# Patient Record
Sex: FEMALE | Race: BLACK OR AFRICAN AMERICAN | NOT HISPANIC OR LATINO | Employment: OTHER | URBAN - METROPOLITAN AREA
[De-identification: names, ages, dates, MRNs, and addresses within clinical notes are randomized per-mention and may not be internally consistent; named-entity substitution may affect disease eponyms.]

---

## 2017-04-17 ENCOUNTER — OFFICE VISIT (OUTPATIENT)
Dept: OBSTETRICS AND GYNECOLOGY | Facility: CLINIC | Age: 22
End: 2017-04-17
Payer: COMMERCIAL

## 2017-04-17 VITALS
SYSTOLIC BLOOD PRESSURE: 124 MMHG | HEIGHT: 65 IN | RESPIRATION RATE: 16 BRPM | WEIGHT: 240.94 LBS | DIASTOLIC BLOOD PRESSURE: 78 MMHG | BODY MASS INDEX: 40.14 KG/M2

## 2017-04-17 DIAGNOSIS — Z01.419 VISIT FOR GYNECOLOGIC EXAMINATION: Primary | ICD-10-CM

## 2017-04-17 DIAGNOSIS — B96.89 BV (BACTERIAL VAGINOSIS): ICD-10-CM

## 2017-04-17 DIAGNOSIS — N76.0 BV (BACTERIAL VAGINOSIS): ICD-10-CM

## 2017-04-17 DIAGNOSIS — Z12.4 ENCOUNTER FOR PAPANICOLAOU SMEAR FOR CERVICAL CANCER SCREENING: ICD-10-CM

## 2017-04-17 PROCEDURE — 88175 CYTOPATH C/V AUTO FLUID REDO: CPT

## 2017-04-17 PROCEDURE — 99395 PREV VISIT EST AGE 18-39: CPT | Mod: S$GLB,,, | Performed by: ADVANCED PRACTICE MIDWIFE

## 2017-04-17 PROCEDURE — 87591 N.GONORRHOEAE DNA AMP PROB: CPT

## 2017-04-17 PROCEDURE — 99999 PR PBB SHADOW E&M-EST. PATIENT-LVL III: CPT | Mod: PBBFAC,,, | Performed by: ADVANCED PRACTICE MIDWIFE

## 2017-04-17 RX ORDER — METRONIDAZOLE 500 MG/1
500 TABLET ORAL EVERY 12 HOURS
Qty: 14 TABLET | Refills: 0 | Status: SHIPPED | OUTPATIENT
Start: 2017-04-17 | End: 2017-04-24

## 2017-04-17 RX ORDER — MELOXICAM 15 MG/1
1 TABLET ORAL DAILY
Refills: 1 | COMMUNITY
Start: 2017-04-10 | End: 2020-01-27

## 2017-04-17 RX ORDER — NORETHINDRONE ACETATE AND ETHINYL ESTRADIOL .02; 1 MG/1; MG/1
1 TABLET ORAL DAILY
Qty: 30 TABLET | Refills: 11 | Status: SHIPPED | OUTPATIENT
Start: 2017-04-17 | End: 2018-11-12

## 2017-04-17 NOTE — PROGRESS NOTES
Subjective:       Patient ID: Roger Juarez is a 22 y.o. female.    Chief Complaint:  No chief complaint on file.    Patient's last menstrual period was 2017.  History of Present Illness  Annual exam without complaint    OB History    Para Term  AB SAB TAB Ectopic Multiple Living   1 1 1      0 1      # Outcome Date GA Lbr Miguel/2nd Weight Sex Delivery Anes PTL Lv   1 Term 16 38w6d  3.124 kg (6 lb 14.2 oz) M CS-LTranv EPI N Y      Complications: Fetal Intolerance          Review of Systems  Review of Systems   All other systems reviewed and are negative.          Objective:    Physical Exam   Constitutional: She is oriented to person, place, and time. She appears well-developed and well-nourished.   HENT:   Head: Normocephalic.   Neck: Normal range of motion.   Abdominal: Soft. Bowel sounds are normal.   Genitourinary: Uterus normal. Vaginal discharge (wetprep +clue cells) found.   Genitourinary Comments: Pap collected   Musculoskeletal: Normal range of motion.   Neurological: She is alert and oriented to person, place, and time.   Skin: Skin is warm and dry.   Psychiatric: She has a normal mood and affect.         Assessment:     1. Visit for gynecologic examination    2. Encounter for Papanicolaou smear for cervical cancer screening    3. BV (bacterial vaginosis)              Plan:   Diagnoses and all orders for this visit:    Visit for gynecologic examination  -     C. trachomatis/N. gonorrhoeae by AMP DNA Vagina    Encounter for Papanicolaou smear for cervical cancer screening  -     Liquid-based pap smear, screening    BV (bacterial vaginosis)  -     Liquid-based pap smear, screening    Other orders  -     metronidazole (FLAGYL) 500 MG tablet; Take 1 tablet (500 mg total) by mouth every 12 (twelve) hours.  -     norethindrone-ethinyl estradiol (MICROGESTIN ) 1-20 mg-mcg per tablet; Take 1 tablet by mouth once daily.    Discussed birth control options due to present pills  causing nausea. Pt wants to continue with birth control pills as her option. Pt to try new pills and will take them at night to see if a decrease in nausea. Stella

## 2017-04-18 LAB
C TRACH DNA SPEC QL NAA+PROBE: NOT DETECTED
N GONORRHOEA DNA SPEC QL NAA+PROBE: NOT DETECTED

## 2018-11-12 ENCOUNTER — OFFICE VISIT (OUTPATIENT)
Dept: OBSTETRICS AND GYNECOLOGY | Facility: CLINIC | Age: 23
End: 2018-11-12
Payer: COMMERCIAL

## 2018-11-12 VITALS
SYSTOLIC BLOOD PRESSURE: 128 MMHG | WEIGHT: 254.63 LBS | DIASTOLIC BLOOD PRESSURE: 74 MMHG | HEIGHT: 65 IN | BODY MASS INDEX: 42.42 KG/M2

## 2018-11-12 DIAGNOSIS — N76.0 VULVOVAGINITIS: ICD-10-CM

## 2018-11-12 DIAGNOSIS — Z01.419 ENCOUNTER FOR GYNECOLOGICAL EXAMINATION WITHOUT ABNORMAL FINDING: Primary | ICD-10-CM

## 2018-11-12 PROCEDURE — 87106 FUNGI IDENTIFICATION YEAST: CPT

## 2018-11-12 PROCEDURE — 87491 CHLMYD TRACH DNA AMP PROBE: CPT

## 2018-11-12 PROCEDURE — 99395 PREV VISIT EST AGE 18-39: CPT | Mod: 25,S$GLB,, | Performed by: OBSTETRICS & GYNECOLOGY

## 2018-11-12 PROCEDURE — 99999 PR PBB SHADOW E&M-EST. PATIENT-LVL III: CPT | Mod: PBBFAC,,, | Performed by: OBSTETRICS & GYNECOLOGY

## 2018-11-12 PROCEDURE — 87070 CULTURE OTHR SPECIMN AEROBIC: CPT

## 2018-11-12 NOTE — PROGRESS NOTES
"CC: Well woman exam    Roger Juarez is a 23 y.o. female  presents for a well woman exam.  LMP: Patient's last menstrual period was 2018 (exact date). thinks she has been getting yeast infection after almost every cycle, using monistat w/ no relief. Concerned about stds as well. Symptoms of vulvar itching but little bit at introital opening. Symptoms started w/ brazilian wax 2018; developed "bumps" few weeks later. Boyfriend of 4 yrs also had "bumps on butt" & received antibiotics from PCP for treatment, which resolved symptoms    Past Medical History:   Diagnosis Date    Hand pain, right      Past Surgical History:   Procedure Laterality Date     SECTION      DELIVERY-CEASAREAN SECTION N/A 2016    Performed by Juan José Felix MD at [facility] L&D     Social History     Socioeconomic History    Marital status: Single     Spouse name: None    Number of children: None    Years of education: None    Highest education level: None   Social Needs    Financial resource strain: None    Food insecurity - worry: None    Food insecurity - inability: None    Transportation needs - medical: None    Transportation needs - non-medical: None   Occupational History    None   Tobacco Use    Smoking status: Never Smoker    Smokeless tobacco: Never Used   Substance and Sexual Activity    Alcohol use: Yes     Alcohol/week: 0.0 oz     Comment: occasional    Drug use: No    Sexual activity: Yes     Partners: Male     Birth control/protection: None   Other Topics Concern    None   Social History Narrative    None     Family History   Problem Relation Age of Onset    Breast cancer Neg Hx     Colon cancer Neg Hx     Ovarian cancer Neg Hx      OB History      Para Term  AB Living    1 1 1     1    SAB TAB Ectopic Multiple Live Births          0 1          Current Outpatient Medications:     meloxicam (MOBIC) 15 MG tablet, Take 1 tablet by mouth once daily., Disp: , Rfl: " "1    GYNECOLOGY HISTORY:  No abnormal pap; chlamydia    DATA REVIEWED:  Last pap: 2017 Results: normal    /74   Ht 5' 5" (1.651 m)   Wt 115.5 kg (254 lb 10.1 oz)   LMP 11/12/2018 (Exact Date)   BMI 42.37 kg/m²     ROS:  GENERAL: Denies weight gain or weight loss. Feeling well overall.   SKIN: Denies rash or lesions.   HEAD: Denies head injury or headache.   NODES: Denies enlarged lymph nodes.   CHEST: Denies chest pain or shortness of breath.   CARDIOVASCULAR: Denies palpitations or left sided chest pain.   ABDOMEN: No abdominal pain, constipation, diarrhea, nausea, vomiting or rectal bleeding.   URINARY: No frequency, dysuria, hematuria, or burning on urination.  REPRODUCTIVE: See HPI.   BREASTS: The patient denies pain, lumps, or nipple discharge.   HEMATOLOGIC: No easy bruisability or excessive bleeding.   MUSCULOSKELETAL: Denies joint pain or swelling.   NEUROLOGIC: Denies syncope or weakness.   PSYCHIATRIC: Denies depression, anxiety or mood swings.    PHYSICAL EXAM:    APPEARANCE: Well nourished, well developed, in no acute distress.  AFFECT: WNL, alert and oriented x 3  SKIN: No acne or hirsutism  NECK: Neck symmetric without masses or thyromegaly  NODES: No inguinal, cervical, axillary, or femoral lymph node enlargement  CHEST: Good respiratory effect  ABDOMEN: Soft.  No tenderness or masses.  No hepatosplenomegaly.  No hernias.  BREASTS: Symmetrical, no skin changes or visible lesions.  No palpable masses, nipple discharge bilaterally.  PELVIC: Normal external genitalia; pinpoint circular abrasion on left labia majora.  Skin bilateral creases dry. Normal hair distribution.  Adequate perineal body, normal urethral meatus.  Vagina moist and well rugated without lesions; cycle bleeding discharge present.  Cervix pink, without lesions, discharge or tenderness.  No significant cystocele or rectocele.  Bimanual exam shows uterus to be normal size, regular, mobile and nontender.  Adnexa without masses or " tenderness.    EXTREMITIES: No edema.    Encounter for gynecological examination without abnormal finding    Vulvovaginitis  -     Genital Culture  -     C. trachomatis/N. gonorrhoeae by AMP DNA    Patient was counseled today on A.C.S. Pap guidelines (Q3) and recommendations for yearly pelvic exams, yearly mammograms starting age 40, and clinical breast exams; to see her PCP for other health maintenance. rec hydrocortisone & clotrimazole to external genitalia prn symptoms

## 2018-11-13 LAB
C TRACH DNA SPEC QL NAA+PROBE: NOT DETECTED
N GONORRHOEA DNA SPEC QL NAA+PROBE: NOT DETECTED

## 2018-11-15 LAB — BACTERIA GENITAL AEROBE CULT: NORMAL

## 2018-11-16 ENCOUNTER — PATIENT MESSAGE (OUTPATIENT)
Dept: OBSTETRICS AND GYNECOLOGY | Facility: CLINIC | Age: 23
End: 2018-11-16

## 2018-11-16 RX ORDER — FLUCONAZOLE 150 MG/1
150 TABLET ORAL ONCE
Qty: 1 TABLET | Refills: 0 | Status: SHIPPED | OUTPATIENT
Start: 2018-11-16 | End: 2018-11-16

## 2018-12-07 ENCOUNTER — PATIENT MESSAGE (OUTPATIENT)
Dept: OBSTETRICS AND GYNECOLOGY | Facility: CLINIC | Age: 23
End: 2018-12-07

## 2019-12-12 ENCOUNTER — LAB VISIT (OUTPATIENT)
Dept: LAB | Facility: HOSPITAL | Age: 24
End: 2019-12-12
Attending: ADVANCED PRACTICE MIDWIFE
Payer: MEDICAID

## 2019-12-12 ENCOUNTER — OFFICE VISIT (OUTPATIENT)
Dept: OBSTETRICS AND GYNECOLOGY | Facility: CLINIC | Age: 24
End: 2019-12-12
Payer: MEDICAID

## 2019-12-12 VITALS — WEIGHT: 25.81 LBS | BODY MASS INDEX: 4.29 KG/M2 | DIASTOLIC BLOOD PRESSURE: 64 MMHG | SYSTOLIC BLOOD PRESSURE: 118 MMHG

## 2019-12-12 DIAGNOSIS — Z32.01 POSITIVE PREGNANCY TEST: Primary | ICD-10-CM

## 2019-12-12 DIAGNOSIS — Z98.891 PREVIOUS CESAREAN SECTION: ICD-10-CM

## 2019-12-12 DIAGNOSIS — Z32.01 POSITIVE PREGNANCY TEST: ICD-10-CM

## 2019-12-12 LAB
ABO + RH BLD: NORMAL
BASOPHILS # BLD AUTO: 0.02 K/UL (ref 0–0.2)
BASOPHILS NFR BLD: 0.4 % (ref 0–1.9)
BLD GP AB SCN CELLS X3 SERPL QL: NORMAL
DIFFERENTIAL METHOD: ABNORMAL
EOSINOPHIL # BLD AUTO: 0 K/UL (ref 0–0.5)
EOSINOPHIL NFR BLD: 0.6 % (ref 0–8)
ERYTHROCYTE [DISTWIDTH] IN BLOOD BY AUTOMATED COUNT: 13.3 % (ref 11.5–14.5)
HCT VFR BLD AUTO: 37.1 % (ref 37–48.5)
HGB BLD-MCNC: 11.6 G/DL (ref 12–16)
IMM GRANULOCYTES # BLD AUTO: 0.02 K/UL (ref 0–0.04)
IMM GRANULOCYTES NFR BLD AUTO: 0.4 % (ref 0–0.5)
LYMPHOCYTES # BLD AUTO: 1.1 K/UL (ref 1–4.8)
LYMPHOCYTES NFR BLD: 22.9 % (ref 18–48)
MCH RBC QN AUTO: 26.4 PG (ref 27–31)
MCHC RBC AUTO-ENTMCNC: 31.3 G/DL (ref 32–36)
MCV RBC AUTO: 84 FL (ref 82–98)
MONOCYTES # BLD AUTO: 0.4 K/UL (ref 0.3–1)
MONOCYTES NFR BLD: 8.1 % (ref 4–15)
NEUTROPHILS # BLD AUTO: 3.3 K/UL (ref 1.8–7.7)
NEUTROPHILS NFR BLD: 67.6 % (ref 38–73)
NRBC BLD-RTO: 0 /100 WBC
PLATELET # BLD AUTO: 205 K/UL (ref 150–350)
PMV BLD AUTO: 12.4 FL (ref 9.2–12.9)
RBC # BLD AUTO: 4.4 M/UL (ref 4–5.4)
WBC # BLD AUTO: 4.81 K/UL (ref 3.9–12.7)

## 2019-12-12 PROCEDURE — 87340 HEPATITIS B SURFACE AG IA: CPT

## 2019-12-12 PROCEDURE — 99999 PR PBB SHADOW E&M-EST. PATIENT-LVL II: CPT | Mod: PBBFAC,,, | Performed by: ADVANCED PRACTICE MIDWIFE

## 2019-12-12 PROCEDURE — 99213 PR OFFICE/OUTPT VISIT, EST, LEVL III, 20-29 MIN: ICD-10-PCS | Mod: TH,S$PBB,, | Performed by: ADVANCED PRACTICE MIDWIFE

## 2019-12-12 PROCEDURE — 86762 RUBELLA ANTIBODY: CPT

## 2019-12-12 PROCEDURE — 86592 SYPHILIS TEST NON-TREP QUAL: CPT

## 2019-12-12 PROCEDURE — 86703 HIV-1/HIV-2 1 RESULT ANTBDY: CPT

## 2019-12-12 PROCEDURE — 85025 COMPLETE CBC W/AUTO DIFF WBC: CPT

## 2019-12-12 PROCEDURE — 99212 OFFICE O/P EST SF 10 MIN: CPT | Mod: PBBFAC,TH,25 | Performed by: ADVANCED PRACTICE MIDWIFE

## 2019-12-12 PROCEDURE — 99213 OFFICE O/P EST LOW 20 MIN: CPT | Mod: TH,S$PBB,, | Performed by: ADVANCED PRACTICE MIDWIFE

## 2019-12-12 PROCEDURE — 36415 COLL VENOUS BLD VENIPUNCTURE: CPT

## 2019-12-12 PROCEDURE — 87491 CHLMYD TRACH DNA AMP PROBE: CPT

## 2019-12-12 PROCEDURE — 86901 BLOOD TYPING SEROLOGIC RH(D): CPT

## 2019-12-12 PROCEDURE — 99999 PR PBB SHADOW E&M-EST. PATIENT-LVL II: ICD-10-PCS | Mod: PBBFAC,,, | Performed by: ADVANCED PRACTICE MIDWIFE

## 2019-12-12 NOTE — PROGRESS NOTES
CHIEF COMPLAINT:   Patient presents with      Possible Pregnancy        HISTORY OF PRESENT ILLNESS  Roger Juarez 24 y.o.  presents for pregnancy risk assessment.   The patient has no complaints today.  No nausea or vomiting. No bleeding or pain.  Pregnancy was not  planned but is desired.  Partner is supportive of pregnancy.  Lives at home with sister and son.  No pets at home.  Works at hair salon.  Denies domestic abuse.  Denies chemical/pesticide/radiation exposure.  OB history:      LMP: Patient's last menstrual period was 09/15/2019 (exact date).  EDC: Estimated Date of Delivery: 20  EGA: Unknown       Health Maintenance   Topic Date Due    Lipid Panel  1993    Pap Smear  2020    TETANUS VACCINE  2025       Past Medical History:   Diagnosis Date    Hand pain, right        Past Surgical History:   Procedure Laterality Date     SECTION         Family History   Problem Relation Age of Onset    Breast cancer Neg Hx     Colon cancer Neg Hx     Ovarian cancer Neg Hx        Social History     Socioeconomic History    Marital status: Single     Spouse name: Not on file    Number of children: Not on file    Years of education: Not on file    Highest education level: Not on file   Occupational History    Not on file   Social Needs    Financial resource strain: Not on file    Food insecurity:     Worry: Not on file     Inability: Not on file    Transportation needs:     Medical: Not on file     Non-medical: Not on file   Tobacco Use    Smoking status: Never Smoker    Smokeless tobacco: Never Used   Substance and Sexual Activity    Alcohol use: Not Currently     Alcohol/week: 0.0 standard drinks     Comment: occasional    Drug use: No    Sexual activity: Yes     Partners: Male     Birth control/protection: None   Lifestyle    Physical activity:     Days per week: Not on file     Minutes per session: Not on file    Stress: Not on file   Relationships     Social connections:     Talks on phone: Not on file     Gets together: Not on file     Attends Zoroastrianism service: Not on file     Active member of club or organization: Not on file     Attends meetings of clubs or organizations: Not on file     Relationship status: Not on file   Other Topics Concern    Not on file   Social History Narrative    Not on file       Current Outpatient Medications   Medication Sig Dispense Refill    meloxicam (MOBIC) 15 MG tablet Take 1 tablet by mouth once daily.  1     No current facility-administered medications for this visit.        Review of patient's allergies indicates:  No Known Allergies      PHYSICAL EXAM   Vitals:    12/12/19 0727   BP: 118/64        PAIN SCALE: 0/10 None    PHYSICAL EXAM    ROS:  GENERAL: No fever, chills, fatigability or weight loss.  CV: Denies chest pain  PULM: Denies shortness of breath or wheezing.  ABDOMEN: Appetite fine. No weight loss. Denies diarrhea, abdominal pain, hematemesis or blood in stool.  URINARY: No flank pain, dysuria or hematuria.  REPRODUCTIVE: No abnormal vaginal bleeding.       PE:   APPEARANCE: Well nourished, well developed, in no acute distress  CHEST: Clear to auscultation bilaterally  CV: Regular rate and rhythm  BREASTS: Symmetrical, no skin changes or visible lesions. No palpable masses, nipple discharge or adenopathy bilaterally.  ABDOMEN: Soft. No tenderness or masses. No hepatosplenomegaly. No hernias  PELVIC:   VULVA: No lesions. Normal female genitalia.  URETHRAL MEATUS: Normal size and location, no lesions, no prolapse.  URETHRA: No masses, tenderness, prolapse or scarring.  VAGINA: Moist and well rugated, no discharge, no significant cystocele or rectocele.  CERVIX: No lesions, normal diameter, no stenosis, no cervical motion tenderness.   UTERUS: 12 size, regular shape, mobile, non-tender, normal position, good support.  ADNEXA: No masses, tenderness or CDS nodularity.  ANUS PERINEUM: No lesions, no relaxation,  no external hemorrhoids.     UPT +    A/P:     -      Patient was counseled today on A.C.S. Pap guidelines and recommendations for yearly pelvic exams, mammograms and monthly self breast exams; to see her PCP for other health maintenance and pregnancy.   -      Patient's medications and medical history reviewed with patient and implications in pregnancy.   -      Pregnancy course discussed and 'AtoZ' book given. Patient was counseled on proper weight gain based on the [place] [facility]'s recommendations based on her pre-pregnancy weight. Discussed foods to avoid in pregnancy (i.e. sushi, fish that are high in mercury, deli meat, and unpasteurized cheeses). Discussed prenatal vitamin options (i.e. stool softener, DHA). Discussed potential medical problems in pregnancy.  -     Discussed risk of Toxoplasmosis transmission from pets and reviewed risk reduction techniques  -     Chromosomal abnormality risk discussed and available testing offered - declined .   -     Pt was counseled on exercise in pregnancy and weight gain recommendations.  -     Pt was counseled on travel recommendations and on risks of Zika virus exposure.  Current CDC Zika advisories and prevention techniques were reviewed with pt.  Pt denies any recent international travel and does not plan travel during pregnancy.  Pt reports that partner does not plan travel either.  -     Oriented to practice including CNM collaboration.   -     Follow-up initial OB, with labs and u/s.

## 2019-12-13 LAB
C TRACH DNA SPEC QL NAA+PROBE: NOT DETECTED
HBV SURFACE AG SERPL QL IA: NEGATIVE
HIV 1+2 AB+HIV1 P24 AG SERPL QL IA: NEGATIVE
N GONORRHOEA DNA SPEC QL NAA+PROBE: NOT DETECTED
RPR SER QL: NORMAL
RUBV IGG SER-ACNC: 8.6 IU/ML
RUBV IGG SER-IMP: ABNORMAL

## 2019-12-17 ENCOUNTER — PROCEDURE VISIT (OUTPATIENT)
Dept: OBSTETRICS AND GYNECOLOGY | Facility: CLINIC | Age: 24
End: 2019-12-17
Payer: MEDICAID

## 2019-12-17 ENCOUNTER — INITIAL PRENATAL (OUTPATIENT)
Dept: OBSTETRICS AND GYNECOLOGY | Facility: CLINIC | Age: 24
End: 2019-12-17
Payer: MEDICAID

## 2019-12-17 VITALS
SYSTOLIC BLOOD PRESSURE: 116 MMHG | WEIGHT: 244.06 LBS | BODY MASS INDEX: 40.61 KG/M2 | DIASTOLIC BLOOD PRESSURE: 76 MMHG

## 2019-12-17 DIAGNOSIS — Z32.01 POSITIVE PREGNANCY TEST: ICD-10-CM

## 2019-12-17 DIAGNOSIS — O30.009 TWINS, 1ST TRIMESTER SCREENING: Primary | ICD-10-CM

## 2019-12-17 DIAGNOSIS — O30.041 DICHORIONIC DIAMNIOTIC TWIN PREGNANCY IN FIRST TRIMESTER: ICD-10-CM

## 2019-12-17 DIAGNOSIS — Z98.891 PREVIOUS CESAREAN SECTION: ICD-10-CM

## 2019-12-17 DIAGNOSIS — Z36.89 TWINS, 1ST TRIMESTER SCREENING: Primary | ICD-10-CM

## 2019-12-17 PROCEDURE — 76815 OB US LIMITED FETUS(S): CPT | Mod: 26,S$PBB,, | Performed by: OBSTETRICS & GYNECOLOGY

## 2019-12-17 PROCEDURE — 99214 OFFICE O/P EST MOD 30 MIN: CPT | Mod: TH,S$PBB,, | Performed by: ADVANCED PRACTICE MIDWIFE

## 2019-12-17 PROCEDURE — 76815 OB US LIMITED FETUS(S): CPT | Mod: PBBFAC | Performed by: OBSTETRICS & GYNECOLOGY

## 2019-12-17 PROCEDURE — 76815 PR  US,PREGNANT UTERUS,LIMITED, 1/> FETUSES: ICD-10-PCS | Mod: 26,S$PBB,, | Performed by: OBSTETRICS & GYNECOLOGY

## 2019-12-17 PROCEDURE — 99212 OFFICE O/P EST SF 10 MIN: CPT | Mod: PBBFAC | Performed by: ADVANCED PRACTICE MIDWIFE

## 2019-12-17 PROCEDURE — 99214 PR OFFICE/OUTPT VISIT, EST, LEVL IV, 30-39 MIN: ICD-10-PCS | Mod: TH,S$PBB,, | Performed by: ADVANCED PRACTICE MIDWIFE

## 2019-12-17 PROCEDURE — 99999 PR PBB SHADOW E&M-EST. PATIENT-LVL II: CPT | Mod: PBBFAC,,, | Performed by: ADVANCED PRACTICE MIDWIFE

## 2019-12-17 PROCEDURE — 99999 PR PBB SHADOW E&M-EST. PATIENT-LVL II: ICD-10-PCS | Mod: PBBFAC,,, | Performed by: ADVANCED PRACTICE MIDWIFE

## 2019-12-17 RX ORDER — PROMETHAZINE HYDROCHLORIDE 25 MG/1
25 TABLET ORAL EVERY 4 HOURS
Qty: 20 TABLET | Refills: 1 | Status: SHIPPED | OUTPATIENT
Start: 2019-12-17 | End: 2020-03-10 | Stop reason: SDUPTHER

## 2019-12-17 NOTE — PROGRESS NOTES
sono shows di/di twins EDC 6/11/20  Will rescan 20 weeks for anatomy  Labs and sono reviewed  rx for phenergan and vitamins  Weight goal 35 set

## 2020-01-23 ENCOUNTER — TELEPHONE (OUTPATIENT)
Dept: OBSTETRICS AND GYNECOLOGY | Facility: CLINIC | Age: 25
End: 2020-01-23

## 2020-01-23 NOTE — TELEPHONE ENCOUNTER
----- Message from Le Rose sent at 1/23/2020  8:58 AM CST -----  Contact: pt  Please call pt @ [phone] regarding rescheduling appt with Jorge, pt states she was told she can be seen until medicaid approve, pt states its pending.

## 2020-01-23 NOTE — TELEPHONE ENCOUNTER
Returned pt call in regards to scheduling an appt. Pt stated that she had to reapply for medicaid, but her chart currently shows uninsured instead of pending. Advised pt that it has to say pending in order to schedule an appt. Gave pt the appropriate contact info to have the uninsured corrected to pending. Pt verbalized understanding.

## 2020-01-27 ENCOUNTER — ROUTINE PRENATAL (OUTPATIENT)
Dept: OBSTETRICS AND GYNECOLOGY | Facility: CLINIC | Age: 25
End: 2020-01-27
Payer: MEDICAID

## 2020-01-27 VITALS
DIASTOLIC BLOOD PRESSURE: 80 MMHG | WEIGHT: 255.31 LBS | SYSTOLIC BLOOD PRESSURE: 126 MMHG | BODY MASS INDEX: 42.48 KG/M2

## 2020-01-27 DIAGNOSIS — O99.212 OBESITY AFFECTING PREGNANCY IN SECOND TRIMESTER: ICD-10-CM

## 2020-01-27 DIAGNOSIS — Z98.891 PREVIOUS CESAREAN SECTION: ICD-10-CM

## 2020-01-27 DIAGNOSIS — O30.049 DICHORIONIC DIAMNIOTIC TWIN PREGNANCY, ANTEPARTUM: Primary | ICD-10-CM

## 2020-01-27 PROCEDURE — 99213 PR OFFICE/OUTPT VISIT, EST, LEVL III, 20-29 MIN: ICD-10-PCS | Mod: TH,S$PBB,, | Performed by: ADVANCED PRACTICE MIDWIFE

## 2020-01-27 PROCEDURE — 99999 PR PBB SHADOW E&M-EST. PATIENT-LVL II: CPT | Mod: PBBFAC,,, | Performed by: ADVANCED PRACTICE MIDWIFE

## 2020-01-27 PROCEDURE — 99212 OFFICE O/P EST SF 10 MIN: CPT | Mod: PBBFAC,TH | Performed by: ADVANCED PRACTICE MIDWIFE

## 2020-01-27 PROCEDURE — 99999 PR PBB SHADOW E&M-EST. PATIENT-LVL II: ICD-10-PCS | Mod: PBBFAC,,, | Performed by: ADVANCED PRACTICE MIDWIFE

## 2020-01-27 PROCEDURE — 99213 OFFICE O/P EST LOW 20 MIN: CPT | Mod: TH,S$PBB,, | Performed by: ADVANCED PRACTICE MIDWIFE

## 2020-01-27 NOTE — PROGRESS NOTES
Ultrasound ordered  working on her medicaid  Patient viewed [employer] video, Learn Your Baby and was provided with [facility] handouts: Baby Led Feeding and Rooming In. Discussed benefits of rooming-in 24 hours a day, benefits of cue-based feeding, the impact of feeding frequency on milk supply, and basic breastfeeding management. Encouraged patient to attend [facility]s Prenatal Breastfeeding Class and to download the [employer] mobile sandra if she has not already done so. Patient verbalizes understanding.

## 2020-01-29 ENCOUNTER — PROCEDURE VISIT (OUTPATIENT)
Dept: OBSTETRICS AND GYNECOLOGY | Facility: CLINIC | Age: 25
End: 2020-01-29
Payer: MEDICAID

## 2020-01-29 DIAGNOSIS — Z36.89 ENCOUNTER FOR FETAL ANATOMIC SURVEY: ICD-10-CM

## 2020-01-29 DIAGNOSIS — O30.049 DICHORIONIC DIAMNIOTIC TWIN PREGNANCY, ANTEPARTUM: ICD-10-CM

## 2020-01-29 PROCEDURE — 76810 OB US >/= 14 WKS ADDL FETUS: CPT | Mod: 59,PBBFAC | Performed by: OBSTETRICS & GYNECOLOGY

## 2020-01-29 PROCEDURE — 76805 OB US >/= 14 WKS SNGL FETUS: CPT | Mod: 59,PBBFAC | Performed by: OBSTETRICS & GYNECOLOGY

## 2020-01-29 PROCEDURE — 76805 OB US >/= 14 WKS SNGL FETUS: CPT | Mod: 26,59,S$PBB, | Performed by: OBSTETRICS & GYNECOLOGY

## 2020-01-29 PROCEDURE — 76810 OB US >/= 14 WKS ADDL FETUS: CPT | Mod: 26,59,S$PBB, | Performed by: OBSTETRICS & GYNECOLOGY

## 2020-01-29 PROCEDURE — 76805 PR US, OB 14+WKS, TRANSABD, SINGLE GESTATION: ICD-10-PCS | Mod: 26,59,S$PBB, | Performed by: OBSTETRICS & GYNECOLOGY

## 2020-01-29 PROCEDURE — 76810 PR US, OB 14+WKS, TRANSABD, EA ADDL GESTATION: ICD-10-PCS | Mod: 26,59,S$PBB, | Performed by: OBSTETRICS & GYNECOLOGY

## 2020-02-21 ENCOUNTER — ROUTINE PRENATAL (OUTPATIENT)
Dept: OBSTETRICS AND GYNECOLOGY | Facility: CLINIC | Age: 25
End: 2020-02-21
Payer: MEDICAID

## 2020-02-21 ENCOUNTER — PROCEDURE VISIT (OUTPATIENT)
Dept: OBSTETRICS AND GYNECOLOGY | Facility: CLINIC | Age: 25
End: 2020-02-21
Payer: MEDICAID

## 2020-02-21 VITALS
DIASTOLIC BLOOD PRESSURE: 70 MMHG | BODY MASS INDEX: 42.37 KG/M2 | SYSTOLIC BLOOD PRESSURE: 118 MMHG | WEIGHT: 254.63 LBS

## 2020-02-21 DIAGNOSIS — O36.5920 INTER-TWIN DISCORDANCE, SECOND TRIMESTER: ICD-10-CM

## 2020-02-21 DIAGNOSIS — O09.92 SUPERVISION OF HIGH RISK PREGNANCY IN SECOND TRIMESTER: ICD-10-CM

## 2020-02-21 DIAGNOSIS — Z98.891 PREVIOUS CESAREAN SECTION: ICD-10-CM

## 2020-02-21 DIAGNOSIS — O30.049 DICHORIONIC DIAMNIOTIC TWIN PREGNANCY, ANTEPARTUM: ICD-10-CM

## 2020-02-21 DIAGNOSIS — O30.042 DICHORIONIC DIAMNIOTIC TWIN PREGNANCY IN SECOND TRIMESTER: Primary | ICD-10-CM

## 2020-02-21 DIAGNOSIS — O30.002 INTER-TWIN DISCORDANCE, SECOND TRIMESTER: ICD-10-CM

## 2020-02-21 PROCEDURE — 76816 PR  US,PREGNANT UTERUS,F/U,TRANSABD APP: ICD-10-PCS | Mod: 26,S$PBB,, | Performed by: OBSTETRICS & GYNECOLOGY

## 2020-02-21 PROCEDURE — 76816 OB US FOLLOW-UP PER FETUS: CPT | Mod: PBBFAC | Performed by: OBSTETRICS & GYNECOLOGY

## 2020-02-21 PROCEDURE — 99213 PR OFFICE/OUTPT VISIT, EST, LEVL III, 20-29 MIN: ICD-10-PCS | Mod: TH,S$PBB,, | Performed by: ADVANCED PRACTICE MIDWIFE

## 2020-02-21 PROCEDURE — 99213 OFFICE O/P EST LOW 20 MIN: CPT | Mod: TH,S$PBB,, | Performed by: ADVANCED PRACTICE MIDWIFE

## 2020-02-21 PROCEDURE — 99999 PR PBB SHADOW E&M-EST. PATIENT-LVL II: ICD-10-PCS | Mod: PBBFAC,,, | Performed by: ADVANCED PRACTICE MIDWIFE

## 2020-02-21 PROCEDURE — 99999 PR PBB SHADOW E&M-EST. PATIENT-LVL II: CPT | Mod: PBBFAC,,, | Performed by: ADVANCED PRACTICE MIDWIFE

## 2020-02-21 PROCEDURE — 99212 OFFICE O/P EST SF 10 MIN: CPT | Mod: PBBFAC,TH | Performed by: ADVANCED PRACTICE MIDWIFE

## 2020-02-21 PROCEDURE — 76816 OB US FOLLOW-UP PER FETUS: CPT | Mod: 26,S$PBB,, | Performed by: OBSTETRICS & GYNECOLOGY

## 2020-02-21 PROCEDURE — 76820 UMBILICAL ARTERY ECHO: CPT | Mod: 26,S$PBB,, | Performed by: OBSTETRICS & GYNECOLOGY

## 2020-02-21 PROCEDURE — 76820 PR US, OB DOPPLER, FETAL UMBILICAL ARTERY ECHO: ICD-10-PCS | Mod: 26,S$PBB,, | Performed by: OBSTETRICS & GYNECOLOGY

## 2020-02-21 PROCEDURE — 76820 UMBILICAL ARTERY ECHO: CPT | Mod: PBBFAC | Performed by: OBSTETRICS & GYNECOLOGY

## 2020-02-21 NOTE — PROGRESS NOTES
sono done Twin A 53% breech  Twin B 12% transverse umbilical artery doppler upper limits of normal rescan 4 weeks  MFM appointment for discordance  28 week labs next visit  Patient viewed [employer] video, Protect Breastfeeding and was provided with [facility] handout: Risks of Formula Feeding. Discussed importance of exclusive breastfeeding for the first 6 months and continuing to breastfeed after the introduction of complementary foods, risks of supplementation, benefits of feeding on demand, the impact of feeding frequency on milk supply, and basic management of breastfeeding. Encouraged patient to attend [facility]s Prenatal Breastfeeding Class and to download the [employer] mobile sandra if she has not already done so. Patient verbalizes understanding.

## 2020-03-02 ENCOUNTER — PROCEDURE VISIT (OUTPATIENT)
Dept: OBSTETRICS AND GYNECOLOGY | Facility: CLINIC | Age: 25
End: 2020-03-02
Payer: MEDICAID

## 2020-03-02 DIAGNOSIS — Z98.891 PREVIOUS CESAREAN SECTION: ICD-10-CM

## 2020-03-02 DIAGNOSIS — O30.002 TWIN PREGNANCY, TWINS DISCORDANT IN SECOND TRIMESTER, FETUS 2 OF MULTIPLE GESTATION: ICD-10-CM

## 2020-03-02 DIAGNOSIS — O36.5922 TWIN PREGNANCY, TWINS DISCORDANT IN SECOND TRIMESTER, FETUS 2 OF MULTIPLE GESTATION: ICD-10-CM

## 2020-03-02 DIAGNOSIS — O30.042 DICHORIONIC DIAMNIOTIC TWIN PREGNANCY IN SECOND TRIMESTER: ICD-10-CM

## 2020-03-02 PROCEDURE — 76811 OB US DETAILED SNGL FETUS: CPT | Mod: 26,S$PBB,, | Performed by: OBSTETRICS & GYNECOLOGY

## 2020-03-02 PROCEDURE — 76812 OB US DETAILED ADDL FETUS: ICD-10-PCS | Mod: 26,S$PBB,, | Performed by: OBSTETRICS & GYNECOLOGY

## 2020-03-02 PROCEDURE — 99214 PR OFFICE/OUTPT VISIT, EST, LEVL IV, 30-39 MIN: ICD-10-PCS | Mod: S$PBB,TH,25, | Performed by: OBSTETRICS & GYNECOLOGY

## 2020-03-02 PROCEDURE — 76811 PR US, OB FETAL EVAL & EXAM, TRANSABDOM,FIRST GESTATION: ICD-10-PCS | Mod: 26,S$PBB,, | Performed by: OBSTETRICS & GYNECOLOGY

## 2020-03-02 PROCEDURE — 76812 OB US DETAILED ADDL FETUS: CPT | Mod: 26,S$PBB,, | Performed by: OBSTETRICS & GYNECOLOGY

## 2020-03-02 PROCEDURE — 76812 OB US DETAILED ADDL FETUS: CPT | Mod: PBBFAC | Performed by: OBSTETRICS & GYNECOLOGY

## 2020-03-02 PROCEDURE — 99214 OFFICE O/P EST MOD 30 MIN: CPT | Mod: S$PBB,TH,25, | Performed by: OBSTETRICS & GYNECOLOGY

## 2020-03-02 PROCEDURE — 76811 OB US DETAILED SNGL FETUS: CPT | Mod: PBBFAC | Performed by: OBSTETRICS & GYNECOLOGY

## 2020-03-05 PROBLEM — O30.049 DICHORIONIC DIAMNIOTIC TWIN PREGNANCY, ANTEPARTUM: Status: ACTIVE | Noted: 2020-03-05

## 2020-03-06 ENCOUNTER — PATIENT MESSAGE (OUTPATIENT)
Dept: OBSTETRICS AND GYNECOLOGY | Facility: CLINIC | Age: 25
End: 2020-03-06

## 2020-03-10 ENCOUNTER — PATIENT MESSAGE (OUTPATIENT)
Dept: OBSTETRICS AND GYNECOLOGY | Facility: CLINIC | Age: 25
End: 2020-03-10

## 2020-03-10 ENCOUNTER — ROUTINE PRENATAL (OUTPATIENT)
Dept: OBSTETRICS AND GYNECOLOGY | Facility: CLINIC | Age: 25
End: 2020-03-10
Payer: MEDICAID

## 2020-03-10 VITALS
DIASTOLIC BLOOD PRESSURE: 58 MMHG | SYSTOLIC BLOOD PRESSURE: 118 MMHG | BODY MASS INDEX: 42.01 KG/M2 | WEIGHT: 252.44 LBS

## 2020-03-10 DIAGNOSIS — O09.92 SUPERVISION OF HIGH RISK PREGNANCY IN SECOND TRIMESTER: ICD-10-CM

## 2020-03-10 DIAGNOSIS — O30.042 DICHORIONIC DIAMNIOTIC TWIN PREGNANCY IN SECOND TRIMESTER: Primary | ICD-10-CM

## 2020-03-10 DIAGNOSIS — B37.31 CANDIDIASIS OF VULVA AND VAGINA: ICD-10-CM

## 2020-03-10 PROCEDURE — 99999 PR PBB SHADOW E&M-EST. PATIENT-LVL II: CPT | Mod: PBBFAC,,, | Performed by: ADVANCED PRACTICE MIDWIFE

## 2020-03-10 PROCEDURE — 99213 PR OFFICE/OUTPT VISIT, EST, LEVL III, 20-29 MIN: ICD-10-PCS | Mod: TH,S$PBB,, | Performed by: ADVANCED PRACTICE MIDWIFE

## 2020-03-10 PROCEDURE — 99213 OFFICE O/P EST LOW 20 MIN: CPT | Mod: TH,S$PBB,, | Performed by: ADVANCED PRACTICE MIDWIFE

## 2020-03-10 PROCEDURE — 99999 PR PBB SHADOW E&M-EST. PATIENT-LVL II: ICD-10-PCS | Mod: PBBFAC,,, | Performed by: ADVANCED PRACTICE MIDWIFE

## 2020-03-10 PROCEDURE — 99212 OFFICE O/P EST SF 10 MIN: CPT | Mod: PBBFAC,TH | Performed by: ADVANCED PRACTICE MIDWIFE

## 2020-03-10 RX ORDER — TERCONAZOLE 4 MG/G
1 CREAM VAGINAL NIGHTLY
Qty: 1 G | Refills: 1 | Status: SHIPPED | OUTPATIENT
Start: 2020-03-10 | End: 2020-03-17

## 2020-03-10 RX ORDER — PROMETHAZINE HYDROCHLORIDE 25 MG/1
25 TABLET ORAL EVERY 4 HOURS
Qty: 20 TABLET | Refills: 1 | Status: SHIPPED | OUTPATIENT
Start: 2020-03-10 | End: 2023-06-16

## 2020-03-10 NOTE — PROGRESS NOTES
Here today with green clumpy discharge Wet mount positive for yeast.  Babies both active  Phenergan refill given

## 2020-03-20 ENCOUNTER — PATIENT MESSAGE (OUTPATIENT)
Dept: OBSTETRICS AND GYNECOLOGY | Facility: CLINIC | Age: 25
End: 2020-03-20

## 2020-03-23 ENCOUNTER — LAB VISIT (OUTPATIENT)
Dept: LAB | Facility: HOSPITAL | Age: 25
End: 2020-03-23
Attending: ADVANCED PRACTICE MIDWIFE
Payer: MEDICAID

## 2020-03-23 ENCOUNTER — PROCEDURE VISIT (OUTPATIENT)
Dept: OBSTETRICS AND GYNECOLOGY | Facility: CLINIC | Age: 25
End: 2020-03-23
Payer: MEDICAID

## 2020-03-23 ENCOUNTER — ROUTINE PRENATAL (OUTPATIENT)
Dept: OBSTETRICS AND GYNECOLOGY | Facility: CLINIC | Age: 25
End: 2020-03-23
Payer: MEDICAID

## 2020-03-23 ENCOUNTER — TELEPHONE (OUTPATIENT)
Dept: OBSTETRICS AND GYNECOLOGY | Facility: CLINIC | Age: 25
End: 2020-03-23

## 2020-03-23 VITALS — BODY MASS INDEX: 42.3 KG/M2 | DIASTOLIC BLOOD PRESSURE: 80 MMHG | SYSTOLIC BLOOD PRESSURE: 126 MMHG | WEIGHT: 254.19 LBS

## 2020-03-23 DIAGNOSIS — O30.049 DICHORIONIC DIAMNIOTIC TWIN PREGNANCY, ANTEPARTUM: ICD-10-CM

## 2020-03-23 DIAGNOSIS — Z98.891 PREVIOUS CESAREAN SECTION: ICD-10-CM

## 2020-03-23 DIAGNOSIS — O30.042 DICHORIONIC DIAMNIOTIC TWIN PREGNANCY IN SECOND TRIMESTER: ICD-10-CM

## 2020-03-23 DIAGNOSIS — O30.043 DICHORIONIC DIAMNIOTIC TWIN PREGNANCY IN THIRD TRIMESTER: Primary | ICD-10-CM

## 2020-03-23 LAB
BASOPHILS # BLD AUTO: 0.01 K/UL (ref 0–0.2)
BASOPHILS NFR BLD: 0.2 % (ref 0–1.9)
DIFFERENTIAL METHOD: ABNORMAL
EOSINOPHIL # BLD AUTO: 0 K/UL (ref 0–0.5)
EOSINOPHIL NFR BLD: 0.9 % (ref 0–8)
ERYTHROCYTE [DISTWIDTH] IN BLOOD BY AUTOMATED COUNT: 12.6 % (ref 11.5–14.5)
GLUCOSE SERPL-MCNC: 113 MG/DL (ref 70–140)
HCT VFR BLD AUTO: 30.4 % (ref 37–48.5)
HGB BLD-MCNC: 9.3 G/DL (ref 12–16)
IMM GRANULOCYTES # BLD AUTO: 0.02 K/UL (ref 0–0.04)
IMM GRANULOCYTES NFR BLD AUTO: 0.4 % (ref 0–0.5)
LYMPHOCYTES # BLD AUTO: 1.1 K/UL (ref 1–4.8)
LYMPHOCYTES NFR BLD: 22.9 % (ref 18–48)
MCH RBC QN AUTO: 26.2 PG (ref 27–31)
MCHC RBC AUTO-ENTMCNC: 30.6 G/DL (ref 32–36)
MCV RBC AUTO: 86 FL (ref 82–98)
MONOCYTES # BLD AUTO: 0.4 K/UL (ref 0.3–1)
MONOCYTES NFR BLD: 7.8 % (ref 4–15)
NEUTROPHILS # BLD AUTO: 3.1 K/UL (ref 1.8–7.7)
NEUTROPHILS NFR BLD: 67.8 % (ref 38–73)
NRBC BLD-RTO: 0 /100 WBC
PLATELET # BLD AUTO: 220 K/UL (ref 150–350)
PMV BLD AUTO: 11.6 FL (ref 9.2–12.9)
RBC # BLD AUTO: 3.55 M/UL (ref 4–5.4)
WBC # BLD AUTO: 4.59 K/UL (ref 3.9–12.7)

## 2020-03-23 PROCEDURE — 76815 OB US LIMITED FETUS(S): CPT | Mod: 26,S$PBB,, | Performed by: OBSTETRICS & GYNECOLOGY

## 2020-03-23 PROCEDURE — 86703 HIV-1/HIV-2 1 RESULT ANTBDY: CPT

## 2020-03-23 PROCEDURE — 99999 PR PBB SHADOW E&M-EST. PATIENT-LVL II: ICD-10-PCS | Mod: PBBFAC,,, | Performed by: ADVANCED PRACTICE MIDWIFE

## 2020-03-23 PROCEDURE — 86592 SYPHILIS TEST NON-TREP QUAL: CPT

## 2020-03-23 PROCEDURE — 76815 OB US LIMITED FETUS(S): CPT | Mod: PBBFAC | Performed by: OBSTETRICS & GYNECOLOGY

## 2020-03-23 PROCEDURE — 99212 OFFICE O/P EST SF 10 MIN: CPT | Mod: PBBFAC,TH,25 | Performed by: ADVANCED PRACTICE MIDWIFE

## 2020-03-23 PROCEDURE — 85025 COMPLETE CBC W/AUTO DIFF WBC: CPT

## 2020-03-23 PROCEDURE — 99213 OFFICE O/P EST LOW 20 MIN: CPT | Mod: 25,TH,S$PBB, | Performed by: ADVANCED PRACTICE MIDWIFE

## 2020-03-23 PROCEDURE — 99999 PR PBB SHADOW E&M-EST. PATIENT-LVL II: CPT | Mod: PBBFAC,,, | Performed by: ADVANCED PRACTICE MIDWIFE

## 2020-03-23 PROCEDURE — 36415 COLL VENOUS BLD VENIPUNCTURE: CPT

## 2020-03-23 PROCEDURE — 82950 GLUCOSE TEST: CPT

## 2020-03-23 PROCEDURE — 76815 PR  US,PREGNANT UTERUS,LIMITED, 1/> FETUSES: ICD-10-PCS | Mod: 26,S$PBB,, | Performed by: OBSTETRICS & GYNECOLOGY

## 2020-03-23 PROCEDURE — 99213 PR OFFICE/OUTPT VISIT, EST, LEVL III, 20-29 MIN: ICD-10-PCS | Mod: 25,TH,S$PBB, | Performed by: ADVANCED PRACTICE MIDWIFE

## 2020-03-23 NOTE — PROGRESS NOTES
sono today Normal dopplers Twin A breech Twin B transverse 24% discordance Rescan 4 weeks  Given connected MOM info  Kick counts both active  28 week labs in progress  TDAP next visit

## 2020-03-23 NOTE — TELEPHONE ENCOUNTER
Fady,   For this di/di twins gestation at 28w4d with 24% discordance what  testing do you recommend?     Fetus A overall growth at 50%  Fetus B overall growth at 24%, but AC at 2%. S/D ratio WNL.  Fluid and fetal movement normal  for both.     Karyn

## 2020-03-24 ENCOUNTER — PATIENT MESSAGE (OUTPATIENT)
Dept: OBSTETRICS AND GYNECOLOGY | Facility: CLINIC | Age: 25
End: 2020-03-24

## 2020-03-24 LAB
HIV 1+2 AB+HIV1 P24 AG SERPL QL IA: NEGATIVE
RPR SER QL: NORMAL

## 2020-03-24 RX ORDER — FERROUS SULFATE 325(65) MG
325 TABLET, DELAYED RELEASE (ENTERIC COATED) ORAL 2 TIMES DAILY
Qty: 60 TABLET | Refills: 3 | Status: SHIPPED | OUTPATIENT
Start: 2020-03-24 | End: 2020-04-24

## 2020-03-27 ENCOUNTER — PATIENT MESSAGE (OUTPATIENT)
Dept: ADMINISTRATIVE | Facility: OTHER | Age: 25
End: 2020-03-27

## 2020-03-31 ENCOUNTER — PATIENT MESSAGE (OUTPATIENT)
Dept: OBSTETRICS AND GYNECOLOGY | Facility: CLINIC | Age: 25
End: 2020-03-31

## 2020-04-06 ENCOUNTER — PATIENT MESSAGE (OUTPATIENT)
Dept: OBSTETRICS AND GYNECOLOGY | Facility: CLINIC | Age: 25
End: 2020-04-06

## 2020-04-06 ENCOUNTER — OFFICE VISIT (OUTPATIENT)
Dept: OBSTETRICS AND GYNECOLOGY | Facility: CLINIC | Age: 25
End: 2020-04-06
Payer: MEDICAID

## 2020-04-06 ENCOUNTER — TELEPHONE (OUTPATIENT)
Dept: OBSTETRICS AND GYNECOLOGY | Facility: CLINIC | Age: 25
End: 2020-04-06

## 2020-04-06 DIAGNOSIS — O30.043 DICHORIONIC DIAMNIOTIC TWIN PREGNANCY IN THIRD TRIMESTER: Primary | ICD-10-CM

## 2020-04-06 DIAGNOSIS — O09.93 SUPERVISION OF HIGH RISK PREGNANCY IN THIRD TRIMESTER: ICD-10-CM

## 2020-04-06 PROCEDURE — 99213 PR OFFICE/OUTPT VISIT, EST, LEVL III, 20-29 MIN: ICD-10-PCS | Mod: TH,95,, | Performed by: ADVANCED PRACTICE MIDWIFE

## 2020-04-06 PROCEDURE — 99213 OFFICE O/P EST LOW 20 MIN: CPT | Mod: TH,95,, | Performed by: ADVANCED PRACTICE MIDWIFE

## 2020-04-06 NOTE — TELEPHONE ENCOUNTER
----- Message from Blanche Pierre sent at 4/6/2020 12:02 PM CDT -----  Contact: [phone]  Would like to consult with the nurse, patient has  Some Question concerning her Virtual video, please call back at  [phone], thanks sj

## 2020-04-06 NOTE — TELEPHONE ENCOUNTER
Patient has video visit today and does not have connected MOM devices.  Advised patient to keep her visit and to be sure and let the provider know she has not received the devices.  Patient verbalized understanding.

## 2020-04-06 NOTE — PROGRESS NOTES
The patient location is: home  The chief complaint leading to consultation is: prenatal visit  Visit type: Virtual visit with synchronous audio and video  Total time spent with patient: 12 min  Each patient to whom he or she provides medical services by telemedicine is:  (1) informed of the relationship between the physician and patient and the respective role of any other health care provider with respect to management of the patient; and (2) notified that he or she may decline to receive medical services by telemedicine and may withdraw from such care at any time.    Notes:kick counts baby active  Covid precautions  Urged to walk 2xd  Birth control options discussed

## 2020-04-09 ENCOUNTER — PATIENT MESSAGE (OUTPATIENT)
Dept: OBSTETRICS AND GYNECOLOGY | Facility: CLINIC | Age: 25
End: 2020-04-09

## 2020-04-14 ENCOUNTER — TELEPHONE (OUTPATIENT)
Dept: OBSTETRICS AND GYNECOLOGY | Facility: CLINIC | Age: 25
End: 2020-04-14

## 2020-04-14 NOTE — TELEPHONE ENCOUNTER
Spoke to pt regarding unemployment paperwork. She asked if we could fill out the form faxed to our office due to her incorrectly filling her unemployment. In addition, she's unable to contact their office. Informed pt her provider did not take her out of work due to pregnancy. 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 Advised pt to refill out the form and [facility].

## 2020-04-24 ENCOUNTER — ROUTINE PRENATAL (OUTPATIENT)
Dept: OBSTETRICS AND GYNECOLOGY | Facility: CLINIC | Age: 25
End: 2020-04-24
Payer: MEDICAID

## 2020-04-24 ENCOUNTER — PROCEDURE VISIT (OUTPATIENT)
Dept: OBSTETRICS AND GYNECOLOGY | Facility: CLINIC | Age: 25
End: 2020-04-24
Payer: MEDICAID

## 2020-04-24 VITALS
DIASTOLIC BLOOD PRESSURE: 74 MMHG | BODY MASS INDEX: 42.81 KG/M2 | WEIGHT: 257.25 LBS | SYSTOLIC BLOOD PRESSURE: 112 MMHG

## 2020-04-24 DIAGNOSIS — O30.043 DICHORIONIC DIAMNIOTIC TWIN PREGNANCY IN THIRD TRIMESTER: Primary | ICD-10-CM

## 2020-04-24 DIAGNOSIS — O09.93 SUPERVISION OF HIGH RISK PREGNANCY IN THIRD TRIMESTER: ICD-10-CM

## 2020-04-24 DIAGNOSIS — O30.043 DICHORIONIC DIAMNIOTIC TWIN PREGNANCY IN THIRD TRIMESTER: ICD-10-CM

## 2020-04-24 PROCEDURE — 76816 OB US FOLLOW-UP PER FETUS: CPT | Mod: 59,PBBFAC | Performed by: OBSTETRICS & GYNECOLOGY

## 2020-04-24 PROCEDURE — 99213 OFFICE O/P EST LOW 20 MIN: CPT | Mod: TH,S$PBB,, | Performed by: ADVANCED PRACTICE MIDWIFE

## 2020-04-24 PROCEDURE — 99212 OFFICE O/P EST SF 10 MIN: CPT | Mod: PBBFAC,TH,25 | Performed by: ADVANCED PRACTICE MIDWIFE

## 2020-04-24 PROCEDURE — 76818 PR US, OB, FETAL BIOPHYSICAL, W/NST: ICD-10-PCS | Mod: 26,59,S$PBB, | Performed by: OBSTETRICS & GYNECOLOGY

## 2020-04-24 PROCEDURE — 76818 FETAL BIOPHYS PROFILE W/NST: CPT | Mod: 26,59,S$PBB, | Performed by: OBSTETRICS & GYNECOLOGY

## 2020-04-24 PROCEDURE — 76816 OB US FOLLOW-UP PER FETUS: CPT | Mod: 26,59,S$PBB, | Performed by: OBSTETRICS & GYNECOLOGY

## 2020-04-24 PROCEDURE — 76816 PR  US,PREGNANT UTERUS,F/U,TRANSABD APP: ICD-10-PCS | Mod: 26,59,S$PBB, | Performed by: OBSTETRICS & GYNECOLOGY

## 2020-04-24 PROCEDURE — 76818 FETAL BIOPHYS PROFILE W/NST: CPT | Mod: 59,PBBFAC | Performed by: OBSTETRICS & GYNECOLOGY

## 2020-04-24 PROCEDURE — 99213 PR OFFICE/OUTPT VISIT, EST, LEVL III, 20-29 MIN: ICD-10-PCS | Mod: TH,S$PBB,, | Performed by: ADVANCED PRACTICE MIDWIFE

## 2020-04-24 PROCEDURE — 99999 PR PBB SHADOW E&M-EST. PATIENT-LVL II: CPT | Mod: PBBFAC,,, | Performed by: ADVANCED PRACTICE MIDWIFE

## 2020-04-24 PROCEDURE — 99999 PR PBB SHADOW E&M-EST. PATIENT-LVL II: ICD-10-PCS | Mod: PBBFAC,,, | Performed by: ADVANCED PRACTICE MIDWIFE

## 2020-04-24 RX ORDER — FERROUS SULFATE 325(65) MG
TABLET ORAL
COMMUNITY
Start: 2020-03-24 | End: 2023-06-16

## 2020-04-24 NOTE — PROGRESS NOTES
Dr Vora next visit to schedule C section  5.1% discordance both cephalic BPP 8/8  Both babies active   Eating healthy slow weight gain Craving fruit and vegetables  Will order nexplanon today

## 2020-05-04 ENCOUNTER — TELEPHONE (OUTPATIENT)
Dept: OBSTETRICS AND GYNECOLOGY | Facility: CLINIC | Age: 25
End: 2020-05-04

## 2020-05-04 NOTE — TELEPHONE ENCOUNTER
Scheduled [facility] Nexplanon delivery to [place] [facility] Jorge CNM Friday  May 8th. mimi Bolden     ----- Message from Odessa Grewal sent at 5/4/2020  1:30 PM CDT -----  Type:  Pharmacy Calling to Clarify an RX    Name of Caller:Homa  Pharmacy Name:[facility]  Prescription Name:  What do they need to clarify?:regarding shipment of medication  Best Call Back Number:[phone] opt 2  Additional Information:

## 2020-05-07 ENCOUNTER — ROUTINE PRENATAL (OUTPATIENT)
Dept: OBSTETRICS AND GYNECOLOGY | Facility: CLINIC | Age: 25
End: 2020-05-07
Payer: MEDICAID

## 2020-05-07 VITALS
BODY MASS INDEX: 43.29 KG/M2 | SYSTOLIC BLOOD PRESSURE: 120 MMHG | WEIGHT: 260.13 LBS | DIASTOLIC BLOOD PRESSURE: 80 MMHG

## 2020-05-07 DIAGNOSIS — O34.219 HISTORY OF CESAREAN DELIVERY, CURRENTLY PREGNANT: Primary | ICD-10-CM

## 2020-05-07 DIAGNOSIS — O30.043 DICHORIONIC DIAMNIOTIC TWIN PREGNANCY IN THIRD TRIMESTER: Primary | ICD-10-CM

## 2020-05-07 PROCEDURE — 90471 IMMUNIZATION ADMIN: CPT | Mod: PBBFAC

## 2020-05-07 PROCEDURE — 99213 OFFICE O/P EST LOW 20 MIN: CPT | Mod: PBBFAC,TH,25 | Performed by: OBSTETRICS & GYNECOLOGY

## 2020-05-07 PROCEDURE — 99213 PR OFFICE/OUTPT VISIT, EST, LEVL III, 20-29 MIN: ICD-10-PCS | Mod: TH,S$PBB,, | Performed by: OBSTETRICS & GYNECOLOGY

## 2020-05-07 PROCEDURE — 99213 OFFICE O/P EST LOW 20 MIN: CPT | Mod: TH,S$PBB,, | Performed by: OBSTETRICS & GYNECOLOGY

## 2020-05-07 PROCEDURE — 99999 PR PBB SHADOW E&M-EST. PATIENT-LVL III: ICD-10-PCS | Mod: PBBFAC,,, | Performed by: OBSTETRICS & GYNECOLOGY

## 2020-05-07 PROCEDURE — 99999 PR PBB SHADOW E&M-EST. PATIENT-LVL III: CPT | Mod: PBBFAC,,, | Performed by: OBSTETRICS & GYNECOLOGY

## 2020-05-07 NOTE — PROGRESS NOTES
Verified pt by two identifiers: name and date of birth.Allergies and medications reviewed.     Adacel 0.5 ml Given IM  To right deltoid using aseptic technique. No discomfort noted, pt tolerated well. Advised to wait 15 minutes in clinic to monitor. Patient verbalized understanding.

## 2020-05-07 NOTE — PROGRESS NOTES
No complaints.  Feels well. Doing her kick counts - can feel the twins separate from each other. No vaginal bleeding, contractions or rupture of membranes. Reviewed breastfeeding education. Labor talk and hospital phone number and location card given. Discussed and reviewed: fetal movements/kick counts, spontaneous rupture of membranes, pre-eclampsia precautions and  labor.    Reviewed recently u/s - vtx/vtx, however pt still desires repeat c/s.   The skin of the suprapubic region was evaluated and appears smooth and healthy.  Counseled the patient to shower daily and to wash this area with an antibacterial soap such as Dial daily.  Advised her to not shave the hair from this area from now until after delivery.  I also counseled the patient to place antibacterial hand soap in all her bathrooms and kitchen at home to help facilitate proper hand hygiene practices before and after delivery.  R/a/b of Rc/s reviewed. Consents signed. Set for 38th week.     Discussed recommendations for dtap - will give today; pt agrees.  F/u next week GBS.

## 2020-05-08 ENCOUNTER — DOCUMENTATION ONLY (OUTPATIENT)
Dept: OBSTETRICS AND GYNECOLOGY | Facility: CLINIC | Age: 25
End: 2020-05-08

## 2020-05-14 ENCOUNTER — ROUTINE PRENATAL (OUTPATIENT)
Dept: OBSTETRICS AND GYNECOLOGY | Facility: CLINIC | Age: 25
End: 2020-05-14
Payer: MEDICAID

## 2020-05-14 VITALS
WEIGHT: 267.19 LBS | DIASTOLIC BLOOD PRESSURE: 78 MMHG | BODY MASS INDEX: 44.46 KG/M2 | SYSTOLIC BLOOD PRESSURE: 132 MMHG

## 2020-05-14 DIAGNOSIS — O30.043 DICHORIONIC DIAMNIOTIC TWIN PREGNANCY IN THIRD TRIMESTER: Primary | ICD-10-CM

## 2020-05-14 PROCEDURE — 99212 OFFICE O/P EST SF 10 MIN: CPT | Mod: PBBFAC,TH | Performed by: OBSTETRICS & GYNECOLOGY

## 2020-05-14 PROCEDURE — 99999 PR PBB SHADOW E&M-EST. PATIENT-LVL II: CPT | Mod: PBBFAC,,, | Performed by: OBSTETRICS & GYNECOLOGY

## 2020-05-14 PROCEDURE — 99213 OFFICE O/P EST LOW 20 MIN: CPT | Mod: TH,S$PBB,, | Performed by: OBSTETRICS & GYNECOLOGY

## 2020-05-14 PROCEDURE — 87081 CULTURE SCREEN ONLY: CPT

## 2020-05-14 PROCEDURE — 87147 CULTURE TYPE IMMUNOLOGIC: CPT

## 2020-05-14 PROCEDURE — 99999 PR PBB SHADOW E&M-EST. PATIENT-LVL II: ICD-10-PCS | Mod: PBBFAC,,, | Performed by: OBSTETRICS & GYNECOLOGY

## 2020-05-14 PROCEDURE — 99213 PR OFFICE/OUTPT VISIT, EST, LEVL III, 20-29 MIN: ICD-10-PCS | Mod: TH,S$PBB,, | Performed by: OBSTETRICS & GYNECOLOGY

## 2020-05-14 NOTE — PROGRESS NOTES
No complaints.  Feels well. Doing her kick counts - feels both babies independantly. No vaginal bleeding, contractions or rupture of membranes. Reviewed breastfeeding education - desires to breast feed. Labor talk and hospital phone number and location card given. Discussed and reviewed: fetal movements/kick counts, spontaneous rupture of membranes, pre-eclampsia precautions and  labor.    GBS done.   On exam a crop of lesions noted on L buttock about 6cm from perineum at 5:00. All were 3-5mm soft skin colored and sesile. No verrucous appearance. Appear to be skin tags. Pt stated she forgot to mention them - they appeared 2mo ago. No itch or pain. No hx GW for her or significant other. Reviewed will continue to watch them and remove PP if symptomatic or they change.    F/u one wk

## 2020-05-16 LAB — BACTERIA SPEC AEROBE CULT: ABNORMAL

## 2020-05-21 ENCOUNTER — PROCEDURE VISIT (OUTPATIENT)
Dept: OBSTETRICS AND GYNECOLOGY | Facility: CLINIC | Age: 25
End: 2020-05-21
Payer: MEDICAID

## 2020-05-21 ENCOUNTER — ROUTINE PRENATAL (OUTPATIENT)
Dept: OBSTETRICS AND GYNECOLOGY | Facility: CLINIC | Age: 25
End: 2020-05-21
Payer: MEDICAID

## 2020-05-21 VITALS
WEIGHT: 274.25 LBS | BODY MASS INDEX: 45.64 KG/M2 | SYSTOLIC BLOOD PRESSURE: 138 MMHG | DIASTOLIC BLOOD PRESSURE: 80 MMHG

## 2020-05-21 DIAGNOSIS — Z98.891 PREVIOUS CESAREAN SECTION: ICD-10-CM

## 2020-05-21 DIAGNOSIS — O09.93 SUPERVISION OF HIGH RISK PREGNANCY IN THIRD TRIMESTER: ICD-10-CM

## 2020-05-21 DIAGNOSIS — O30.043 DICHORIONIC DIAMNIOTIC TWIN PREGNANCY IN THIRD TRIMESTER: Primary | ICD-10-CM

## 2020-05-21 DIAGNOSIS — O30.049 DICHORIONIC DIAMNIOTIC TWIN PREGNANCY, ANTEPARTUM: ICD-10-CM

## 2020-05-21 DIAGNOSIS — O30.043 DICHORIONIC DIAMNIOTIC TWIN PREGNANCY IN THIRD TRIMESTER: ICD-10-CM

## 2020-05-21 PROCEDURE — 76819 PR US, OB, FETAL BIOPHYSICAL, W/O NST: ICD-10-PCS | Mod: 26,S$PBB,, | Performed by: OBSTETRICS & GYNECOLOGY

## 2020-05-21 PROCEDURE — 76819 FETAL BIOPHYS PROFIL W/O NST: CPT | Mod: PBBFAC | Performed by: OBSTETRICS & GYNECOLOGY

## 2020-05-21 PROCEDURE — 99999 PR PBB SHADOW E&M-EST. PATIENT-LVL II: ICD-10-PCS | Mod: PBBFAC,,, | Performed by: ADVANCED PRACTICE MIDWIFE

## 2020-05-21 PROCEDURE — 99213 OFFICE O/P EST LOW 20 MIN: CPT | Mod: TH,S$PBB,, | Performed by: ADVANCED PRACTICE MIDWIFE

## 2020-05-21 PROCEDURE — 76816 OB US FOLLOW-UP PER FETUS: CPT | Mod: PBBFAC | Performed by: OBSTETRICS & GYNECOLOGY

## 2020-05-21 PROCEDURE — 76816 PR  US,PREGNANT UTERUS,F/U,TRANSABD APP: ICD-10-PCS | Mod: 26,S$PBB,, | Performed by: OBSTETRICS & GYNECOLOGY

## 2020-05-21 PROCEDURE — 99212 OFFICE O/P EST SF 10 MIN: CPT | Mod: PBBFAC,TH,25 | Performed by: ADVANCED PRACTICE MIDWIFE

## 2020-05-21 PROCEDURE — 99999 PR PBB SHADOW E&M-EST. PATIENT-LVL II: CPT | Mod: PBBFAC,,, | Performed by: ADVANCED PRACTICE MIDWIFE

## 2020-05-21 PROCEDURE — 76819 FETAL BIOPHYS PROFIL W/O NST: CPT | Mod: 26,S$PBB,, | Performed by: OBSTETRICS & GYNECOLOGY

## 2020-05-21 PROCEDURE — 99213 PR OFFICE/OUTPT VISIT, EST, LEVL III, 20-29 MIN: ICD-10-PCS | Mod: TH,S$PBB,, | Performed by: ADVANCED PRACTICE MIDWIFE

## 2020-05-21 PROCEDURE — 76816 OB US FOLLOW-UP PER FETUS: CPT | Mod: 26,S$PBB,, | Performed by: OBSTETRICS & GYNECOLOGY

## 2020-05-21 NOTE — PROGRESS NOTES
Ready for babies   sono Twin A 6#10 cephalic  Twin B 6#4oz transverse  GBS positive   Has hibiclens for preop shower

## 2020-05-28 ENCOUNTER — ANESTHESIA EVENT (OUTPATIENT)
Dept: OBSTETRICS AND GYNECOLOGY | Facility: HOSPITAL | Age: 25
End: 2020-05-28
Payer: MEDICAID

## 2020-05-28 ENCOUNTER — ANESTHESIA (OUTPATIENT)
Dept: OBSTETRICS AND GYNECOLOGY | Facility: HOSPITAL | Age: 25
End: 2020-05-28
Payer: MEDICAID

## 2020-05-28 ENCOUNTER — HOSPITAL ENCOUNTER (INPATIENT)
Facility: HOSPITAL | Age: 25
LOS: 2 days | Discharge: HOME | End: 2020-05-30
Attending: OBSTETRICS & GYNECOLOGY | Admitting: OBSTETRICS & GYNECOLOGY
Payer: MEDICAID

## 2020-05-28 DIAGNOSIS — Z98.891 S/P REPEAT LOW TRANSVERSE C-SECTION: Primary | ICD-10-CM

## 2020-05-28 DIAGNOSIS — O30.043 DICHORIONIC DIAMNIOTIC TWIN PREGNANCY IN THIRD TRIMESTER: ICD-10-CM

## 2020-05-28 PROBLEM — O42.90 SPROM (PROLONGED SPONTANEOUS RUPTURE OF MEMBRANES): Status: ACTIVE | Noted: 2020-05-28

## 2020-05-28 LAB
ABO + RH BLD: NORMAL
BASOPHILS # BLD AUTO: 0.01 K/UL (ref 0–0.2)
BASOPHILS NFR BLD: 0.2 % (ref 0–1.9)
BLD GP AB SCN CELLS X3 SERPL QL: NORMAL
DIFFERENTIAL METHOD: ABNORMAL
EOSINOPHIL # BLD AUTO: 0 K/UL (ref 0–0.5)
EOSINOPHIL NFR BLD: 0.5 % (ref 0–8)
ERYTHROCYTE [DISTWIDTH] IN BLOOD BY AUTOMATED COUNT: 14.3 % (ref 11.5–14.5)
HCO3 UR-SCNC: 21.5 MMOL/L (ref 24–28)
HCT VFR BLD AUTO: 37.7 % (ref 37–48.5)
HGB BLD-MCNC: 11.6 G/DL (ref 12–16)
IMM GRANULOCYTES # BLD AUTO: 0.03 K/UL (ref 0–0.04)
IMM GRANULOCYTES NFR BLD AUTO: 0.5 % (ref 0–0.5)
LYMPHOCYTES # BLD AUTO: 1.5 K/UL (ref 1–4.8)
LYMPHOCYTES NFR BLD: 25.1 % (ref 18–48)
MCH RBC QN AUTO: 25.6 PG (ref 27–31)
MCHC RBC AUTO-ENTMCNC: 30.8 G/DL (ref 32–36)
MCV RBC AUTO: 83 FL (ref 82–98)
MONOCYTES # BLD AUTO: 0.6 K/UL (ref 0.3–1)
MONOCYTES NFR BLD: 10.1 % (ref 4–15)
NEUTROPHILS # BLD AUTO: 3.9 K/UL (ref 1.8–7.7)
NEUTROPHILS NFR BLD: 63.6 % (ref 38–73)
NRBC BLD-RTO: 0 /100 WBC
PCO2 BLDA: 85.8 MMHG (ref 35–45)
PH SMN: 7.01 [PH] (ref 7.35–7.45)
PLATELET # BLD AUTO: 236 K/UL (ref 150–350)
PMV BLD AUTO: 12.3 FL (ref 9.2–12.9)
PO2 BLDA: 13 MMHG (ref 80–100)
POC BE: -10 MMOL/L
POC SATURATED O2: 8 % (ref 95–100)
RBC # BLD AUTO: 4.53 M/UL (ref 4–5.4)
SAMPLE: ABNORMAL
SARS-COV-2 RDRP RESP QL NAA+PROBE: NEGATIVE
WBC # BLD AUTO: 6.05 K/UL (ref 3.9–12.7)

## 2020-05-28 PROCEDURE — 99900059 HC C-SECTION ATTEND (STAT)

## 2020-05-28 PROCEDURE — 59514 CESAREAN DELIVERY ONLY: CPT | Mod: 22,AT,, | Performed by: OBSTETRICS & GYNECOLOGY

## 2020-05-28 PROCEDURE — 63600175 PHARM REV CODE 636 W HCPCS: Performed by: NURSE ANESTHETIST, CERTIFIED REGISTERED

## 2020-05-28 PROCEDURE — 37000008 HC ANESTHESIA 1ST 15 MINUTES: Performed by: OBSTETRICS & GYNECOLOGY

## 2020-05-28 PROCEDURE — 62326 NJX INTERLAMINAR LMBR/SAC: CPT | Performed by: NURSE ANESTHETIST, CERTIFIED REGISTERED

## 2020-05-28 PROCEDURE — 82803 BLOOD GASES ANY COMBINATION: CPT

## 2020-05-28 PROCEDURE — 25000003 PHARM REV CODE 250: Performed by: OBSTETRICS & GYNECOLOGY

## 2020-05-28 PROCEDURE — 63600175 PHARM REV CODE 636 W HCPCS: Performed by: OBSTETRICS & GYNECOLOGY

## 2020-05-28 PROCEDURE — 36416 COLLJ CAPILLARY BLOOD SPEC: CPT

## 2020-05-28 PROCEDURE — 88307 TISSUE EXAM BY PATHOLOGIST: CPT | Mod: 59 | Performed by: PATHOLOGY

## 2020-05-28 PROCEDURE — 25000003 PHARM REV CODE 250: Performed by: NURSE ANESTHETIST, CERTIFIED REGISTERED

## 2020-05-28 PROCEDURE — 88307 TISSUE EXAM BY PATHOLOGIST: CPT | Mod: 26,,, | Performed by: PATHOLOGY

## 2020-05-28 PROCEDURE — 86850 RBC ANTIBODY SCREEN: CPT

## 2020-05-28 PROCEDURE — 85025 COMPLETE CBC W/AUTO DIFF WBC: CPT

## 2020-05-28 PROCEDURE — 36000686 HC CESAREAN SECTION LEVEL II

## 2020-05-28 PROCEDURE — 59514 PR CESAREAN DELIVERY ONLY: ICD-10-PCS | Mod: AS,AT,, | Performed by: PHYSICIAN ASSISTANT

## 2020-05-28 PROCEDURE — U0002 COVID-19 LAB TEST NON-CDC: HCPCS

## 2020-05-28 PROCEDURE — 11000001 HC ACUTE MED/SURG PRIVATE ROOM

## 2020-05-28 PROCEDURE — 27800516 HC TRAY, EPIDURAL COMBO: Performed by: NURSE ANESTHETIST, CERTIFIED REGISTERED

## 2020-05-28 PROCEDURE — 37000009 HC ANESTHESIA EA ADD 15 MINS: Performed by: OBSTETRICS & GYNECOLOGY

## 2020-05-28 PROCEDURE — 99900035 HC TECH TIME PER 15 MIN (STAT)

## 2020-05-28 PROCEDURE — 88307 PR  SURG PATH,LEVEL V: ICD-10-PCS | Mod: 26,,, | Performed by: PATHOLOGY

## 2020-05-28 PROCEDURE — 59514 PR CESAREAN DELIVERY ONLY: ICD-10-PCS | Mod: 22,AT,, | Performed by: OBSTETRICS & GYNECOLOGY

## 2020-05-28 PROCEDURE — 59514 CESAREAN DELIVERY ONLY: CPT | Mod: AS,AT,, | Performed by: PHYSICIAN ASSISTANT

## 2020-05-28 PROCEDURE — 51702 INSERT TEMP BLADDER CATH: CPT

## 2020-05-28 RX ORDER — SODIUM CHLORIDE, SODIUM LACTATE, POTASSIUM CHLORIDE, CALCIUM CHLORIDE 600; 310; 30; 20 MG/100ML; MG/100ML; MG/100ML; MG/100ML
INJECTION, SOLUTION INTRAVENOUS CONTINUOUS PRN
Status: DISCONTINUED | OUTPATIENT
Start: 2020-05-28 | End: 2020-05-28

## 2020-05-28 RX ORDER — CEFAZOLIN SODIUM 2 G/50ML
2 SOLUTION INTRAVENOUS ONCE AS NEEDED
Status: COMPLETED | OUTPATIENT
Start: 2020-05-28 | End: 2020-05-28

## 2020-05-28 RX ORDER — PHENYLEPHRINE HYDROCHLORIDE 10 MG/ML
INJECTION INTRAVENOUS
Status: DISCONTINUED | OUTPATIENT
Start: 2020-05-28 | End: 2020-05-28

## 2020-05-28 RX ORDER — OXYTOCIN/RINGER'S LACTATE 30/500 ML
95 PLASTIC BAG, INJECTION (ML) INTRAVENOUS ONCE
Status: COMPLETED | OUTPATIENT
Start: 2020-05-28 | End: 2020-05-28

## 2020-05-28 RX ORDER — MISOPROSTOL 200 UG/1
800 TABLET ORAL
Status: DISCONTINUED | OUTPATIENT
Start: 2020-05-28 | End: 2020-05-30 | Stop reason: HOSPADM

## 2020-05-28 RX ORDER — HYDROCODONE BITARTRATE AND ACETAMINOPHEN 5; 325 MG/1; MG/1
1 TABLET ORAL EVERY 4 HOURS PRN
Status: DISCONTINUED | OUTPATIENT
Start: 2020-05-28 | End: 2020-05-30 | Stop reason: HOSPADM

## 2020-05-28 RX ORDER — BUPIVACAINE HYDROCHLORIDE 7.5 MG/ML
INJECTION, SOLUTION INTRASPINAL
Status: DISCONTINUED | OUTPATIENT
Start: 2020-05-28 | End: 2020-05-28

## 2020-05-28 RX ORDER — CHLORHEXIDINE GLUCONATE ORAL RINSE 1.2 MG/ML
10 SOLUTION DENTAL
Status: DISCONTINUED | OUTPATIENT
Start: 2020-05-28 | End: 2020-05-30 | Stop reason: HOSPADM

## 2020-05-28 RX ORDER — OXYTOCIN/RINGER'S LACTATE 30/500 ML
41.65 PLASTIC BAG, INJECTION (ML) INTRAVENOUS CONTINUOUS
Status: DISPENSED | OUTPATIENT
Start: 2020-05-28 | End: 2020-05-28

## 2020-05-28 RX ORDER — SODIUM CHLORIDE, SODIUM LACTATE, POTASSIUM CHLORIDE, CALCIUM CHLORIDE 600; 310; 30; 20 MG/100ML; MG/100ML; MG/100ML; MG/100ML
INJECTION, SOLUTION INTRAVENOUS CONTINUOUS
Status: DISCONTINUED | OUTPATIENT
Start: 2020-05-28 | End: 2020-05-30 | Stop reason: HOSPADM

## 2020-05-28 RX ORDER — ONDANSETRON 2 MG/ML
INJECTION INTRAMUSCULAR; INTRAVENOUS
Status: DISCONTINUED | OUTPATIENT
Start: 2020-05-28 | End: 2020-05-28

## 2020-05-28 RX ORDER — DIPHENHYDRAMINE HYDROCHLORIDE 50 MG/ML
INJECTION INTRAMUSCULAR; INTRAVENOUS
Status: DISCONTINUED | OUTPATIENT
Start: 2020-05-28 | End: 2020-05-28

## 2020-05-28 RX ORDER — HYDROCODONE BITARTRATE AND ACETAMINOPHEN 10; 325 MG/1; MG/1
1 TABLET ORAL EVERY 4 HOURS PRN
Status: DISCONTINUED | OUTPATIENT
Start: 2020-05-28 | End: 2020-05-30 | Stop reason: HOSPADM

## 2020-05-28 RX ORDER — DIPHENHYDRAMINE HCL 25 MG
25 CAPSULE ORAL EVERY 4 HOURS PRN
Status: DISCONTINUED | OUTPATIENT
Start: 2020-05-28 | End: 2020-05-30 | Stop reason: HOSPADM

## 2020-05-28 RX ORDER — KETOROLAC TROMETHAMINE 30 MG/ML
INJECTION, SOLUTION INTRAMUSCULAR; INTRAVENOUS
Status: DISCONTINUED | OUTPATIENT
Start: 2020-05-28 | End: 2020-05-28

## 2020-05-28 RX ORDER — IBUPROFEN 800 MG/1
800 TABLET ORAL EVERY 8 HOURS
Status: DISCONTINUED | OUTPATIENT
Start: 2020-05-29 | End: 2020-05-30 | Stop reason: HOSPADM

## 2020-05-28 RX ORDER — DIPHENHYDRAMINE HYDROCHLORIDE 50 MG/ML
25 INJECTION INTRAMUSCULAR; INTRAVENOUS EVERY 4 HOURS PRN
Status: DISCONTINUED | OUTPATIENT
Start: 2020-05-28 | End: 2020-05-30 | Stop reason: HOSPADM

## 2020-05-28 RX ORDER — CARBOPROST TROMETHAMINE 250 UG/ML
250 INJECTION, SOLUTION INTRAMUSCULAR
Status: DISCONTINUED | OUTPATIENT
Start: 2020-05-28 | End: 2020-05-30 | Stop reason: HOSPADM

## 2020-05-28 RX ORDER — CHLORHEXIDINE GLUCONATE ORAL RINSE 1.2 MG/ML
10 SOLUTION DENTAL 2 TIMES DAILY
Status: DISCONTINUED | OUTPATIENT
Start: 2020-05-28 | End: 2020-05-30 | Stop reason: HOSPADM

## 2020-05-28 RX ORDER — FAMOTIDINE 10 MG/ML
20 INJECTION INTRAVENOUS
Status: DISCONTINUED | OUTPATIENT
Start: 2020-05-28 | End: 2020-05-30 | Stop reason: HOSPADM

## 2020-05-28 RX ORDER — SODIUM CITRATE AND CITRIC ACID MONOHYDRATE 334; 500 MG/5ML; MG/5ML
30 SOLUTION ORAL
Status: DISCONTINUED | OUTPATIENT
Start: 2020-05-28 | End: 2020-05-30 | Stop reason: HOSPADM

## 2020-05-28 RX ORDER — MIDAZOLAM HYDROCHLORIDE 1 MG/ML
INJECTION, SOLUTION INTRAMUSCULAR; INTRAVENOUS
Status: DISCONTINUED | OUTPATIENT
Start: 2020-05-28 | End: 2020-05-28

## 2020-05-28 RX ORDER — ADHESIVE BANDAGE
30 BANDAGE TOPICAL EVERY 6 HOURS PRN
Status: DISCONTINUED | OUTPATIENT
Start: 2020-05-29 | End: 2020-05-30 | Stop reason: HOSPADM

## 2020-05-28 RX ORDER — KETOROLAC TROMETHAMINE 30 MG/ML
30 INJECTION, SOLUTION INTRAMUSCULAR; INTRAVENOUS EVERY 8 HOURS
Status: DISPENSED | OUTPATIENT
Start: 2020-05-28 | End: 2020-05-29

## 2020-05-28 RX ORDER — ACETAMINOPHEN 325 MG/1
650 TABLET ORAL EVERY 6 HOURS PRN
Status: DISCONTINUED | OUTPATIENT
Start: 2020-05-28 | End: 2020-05-30 | Stop reason: HOSPADM

## 2020-05-28 RX ORDER — OXYTOCIN/RINGER'S LACTATE 30/500 ML
334 PLASTIC BAG, INJECTION (ML) INTRAVENOUS ONCE
Status: DISCONTINUED | OUTPATIENT
Start: 2020-05-28 | End: 2020-05-30 | Stop reason: HOSPADM

## 2020-05-28 RX ORDER — METHYLERGONOVINE MALEATE 0.2 MG/ML
200 INJECTION INTRAVENOUS
Status: DISCONTINUED | OUTPATIENT
Start: 2020-05-28 | End: 2020-05-30 | Stop reason: HOSPADM

## 2020-05-28 RX ORDER — AMOXICILLIN 250 MG
1 CAPSULE ORAL NIGHTLY PRN
Status: DISCONTINUED | OUTPATIENT
Start: 2020-05-28 | End: 2020-05-30 | Stop reason: HOSPADM

## 2020-05-28 RX ORDER — BISACODYL 10 MG
10 SUPPOSITORY, RECTAL RECTAL ONCE AS NEEDED
Status: DISCONTINUED | OUTPATIENT
Start: 2020-05-28 | End: 2020-05-30 | Stop reason: HOSPADM

## 2020-05-28 RX ORDER — SIMETHICONE 80 MG
1 TABLET,CHEWABLE ORAL EVERY 6 HOURS PRN
Status: DISCONTINUED | OUTPATIENT
Start: 2020-05-28 | End: 2020-05-30 | Stop reason: HOSPADM

## 2020-05-28 RX ORDER — ONDANSETRON 8 MG/1
8 TABLET, ORALLY DISINTEGRATING ORAL EVERY 8 HOURS PRN
Status: DISCONTINUED | OUTPATIENT
Start: 2020-05-28 | End: 2020-05-30 | Stop reason: HOSPADM

## 2020-05-28 RX ORDER — MORPHINE SULFATE 1 MG/ML
INJECTION, SOLUTION EPIDURAL; INTRATHECAL; INTRAVENOUS
Status: DISCONTINUED | OUTPATIENT
Start: 2020-05-28 | End: 2020-05-28

## 2020-05-28 RX ADMIN — MIDAZOLAM 1 MG: 1 INJECTION INTRAMUSCULAR; INTRAVENOUS at 11:05

## 2020-05-28 RX ADMIN — AZITHROMYCIN MONOHYDRATE 500 MG: 500 INJECTION, POWDER, LYOPHILIZED, FOR SOLUTION INTRAVENOUS at 08:05

## 2020-05-28 RX ADMIN — SODIUM CHLORIDE, SODIUM LACTATE, POTASSIUM CHLORIDE, AND CALCIUM CHLORIDE: .6; .31; .03; .02 INJECTION, SOLUTION INTRAVENOUS at 08:05

## 2020-05-28 RX ADMIN — CHLORHEXIDINE GLUCONATE 0.12% ORAL RINSE 10 ML: 1.2 LIQUID ORAL at 09:05

## 2020-05-28 RX ADMIN — Medication 1000 ML/HR: at 10:05

## 2020-05-28 RX ADMIN — SODIUM CHLORIDE, SODIUM LACTATE, POTASSIUM CHLORIDE, AND CALCIUM CHLORIDE 1000 ML: .6; .31; .03; .02 INJECTION, SOLUTION INTRAVENOUS at 08:05

## 2020-05-28 RX ADMIN — PHENYLEPHRINE HYDROCHLORIDE 100 MCG: 10 INJECTION INTRAVENOUS at 10:05

## 2020-05-28 RX ADMIN — BUPIVACAINE HYDROCHLORIDE IN DEXTROSE 1.8 ML: 7.5 INJECTION, SOLUTION SUBARACHNOID at 10:05

## 2020-05-28 RX ADMIN — Medication 250 ML/HR: at 11:05

## 2020-05-28 RX ADMIN — ONDANSETRON 8 MG: 2 INJECTION, SOLUTION INTRAMUSCULAR; INTRAVENOUS at 10:05

## 2020-05-28 RX ADMIN — DIPHENHYDRAMINE HYDROCHLORIDE 12.5 MG: 50 INJECTION INTRAMUSCULAR; INTRAVENOUS at 11:05

## 2020-05-28 RX ADMIN — SODIUM CHLORIDE, SODIUM LACTATE, POTASSIUM CHLORIDE, AND CALCIUM CHLORIDE: 600; 310; 30; 20 INJECTION, SOLUTION INTRAVENOUS at 11:05

## 2020-05-28 RX ADMIN — KETOROLAC TROMETHAMINE 30 MG: 30 INJECTION, SOLUTION INTRAMUSCULAR; INTRAVENOUS at 09:05

## 2020-05-28 RX ADMIN — CEFAZOLIN SODIUM 2 G: 2 SOLUTION INTRAVENOUS at 10:05

## 2020-05-28 RX ADMIN — KETOROLAC TROMETHAMINE 30 MG: 30 INJECTION, SOLUTION INTRAMUSCULAR at 11:05

## 2020-05-28 RX ADMIN — MORPHINE SULFATE 0.25 MG: 1 INJECTION, SOLUTION EPIDURAL; INTRATHECAL; INTRAVENOUS at 10:05

## 2020-05-28 RX ADMIN — SODIUM CHLORIDE, SODIUM LACTATE, POTASSIUM CHLORIDE, AND CALCIUM CHLORIDE: 600; 310; 30; 20 INJECTION, SOLUTION INTRAVENOUS at 10:05

## 2020-05-28 NOTE — L&D DELIVERY NOTE
Section Procedure Note 2020    Pre-operative Diagnosis:   1. Previous   2. Dichorionic/diamniotic twin gestation 38w0d   3. Spontaneous rupture of membranes    Post-operative Diagnosis: same     PROCEDURE:   repeat low transverse  section     Surgeon: Gayla Jimenes MD     Assistants: SALINA Farias (presence necessary for performance of case)     Anesthesia: Spinal anesthesia     IV Fluids: 800mL    Estimated Blood Loss: 300mL     UOP: 50mL     Specimens: placenta, cord pH for twin B    Complications: None     Operative findings: twin A viable male infant vertex presentation, twin B viable female guy breech presentation; normal bilateral tubes/ovaries    Procedure Details   The patient was seen in the Holding Room. The risks, benefits, complications, treatment options, and expected outcomes were discussed with the patient. The patient concurred with the proposed plan, giving informed consent. The patient was taken to Operating Room, identified as Roger Juarez and the procedure verified as  Delivery.     After induction of anesthesia, the patient was draped and prepped in the usual sterile manner in the dorsal supine position. A Pfannenstiel incision was made and carried down through the subcutaneous tissue to the fascia. Fascial incision was made and extended transversely. The fascia was  from the underlying rectus tissue superiorly and inferiorly. The peritoneum was identified and entered with sharp dissection then extended superiorly and inferiorly with good visualization of the underlying bladder.   The utero-vesical peritoneal reflection was adequately retracted from the lower uterine segment. A low transverse uterine incision was made. There was clear amniotic fluid noted. The fetal vertex was delivered atraumatically, bulb suctioned on the field, then fully delivered. Delayed cord clamping performed. Amnion for twin B artificially ruptured with  clear fluid. Buttocks delivered then bilateral arms swept down, fetal vertex delivered with mild delay. The placenta was simply expressed and the uterine cavity was cleared of clots and debris. The uterine incision was reapproximated with running locked sutures of #1 chromic.    Lavage was performed and hemostasis noted. The peritoneum and rectus muscles were re-approximated with 3-0 chromic. The fascia was then reapproximated with running sutures of #1 loop PDS. The skin was reapproximated with 4-0 monocryl in a subcuticular fashion. Instrument, sponge, and needle counts were correct prior the abdominal closure and at the conclusion of the case.     Disposition: to recovery PP room     Condition: stable

## 2020-05-28 NOTE — TRANSFER OF CARE
Anesthesia Transfer of Care Note    Patient: Roger Juarez    Procedure(s) Performed: Procedure(s) (LRB):   SECTION (N/A)    Patient location: Labor and Delivery    Anesthesia Type: spinal    Transport from OR: Transported from OR on room air with adequate spontaneous ventilation    Post pain: adequate analgesia    Post assessment: no apparent anesthetic complications    Post vital signs: stable    Level of consciousness: awake, alert and oriented    Nausea/Vomiting: no nausea/vomiting    Complications: none    Transfer of care protocol was followed      Last vitals:   Visit Vitals  /66   Pulse 110   Temp 36.7 °C (98.1 °F) (Oral)   Resp 18   Wt 124.3 kg (274 lb)   LMP 09/15/2019 (Exact Date)   SpO2 98%   Breastfeeding? No   BMI 45.60 kg/m²

## 2020-05-28 NOTE — H&P
[facility] -   Obstetrics  History & Physical    Patient Name: Roger Shelby  MRN: [id number]  Admission Date: 2020  Primary Care Provider: Primary Doctor No    Subjective:     Principal Problem: SROM    History of Present Illness:  Roger Shleby 25 y.o.  with di-di twin gestation at 38w0d presents for SROM. History of prior csection, desires repeat    Obstetric HPI:  Patient reports few contractions, active fetal movement, No vaginal bleeding , Yes loss of fluid     This pregnancy has been complicated by previous csection    OB History    Para Term  AB Living   2 2 2 0 0 3   SAB TAB Ectopic Multiple Live Births   0 0 0 1 3      # Outcome Date GA Lbr Miguel/2nd Weight Sex Delivery Anes PTL Lv   2A Term 20 38w0d  3.47 kg (7 lb 10.4 oz) M CS-LTranv Spinal N NATALI      Name: ANGELLA SHELBY BOY JAMETRIA      Apgar1: 9  Apgar5: 9   2B Term 20 38w0d  2.76 kg (6 lb 1.4 oz) F CS-LTranv Spinal N NATALI      Name: TRINO SHELBY GIRL JAMETRIA      Apgar1: 1  Apgar5: 8   1 Term 16 38w6d  3.124 kg (6 lb 14.2 oz) M CS-LTranv EPI N NATALI      Complications: Fetal Intolerance      Apgar1: 4  Apgar5: 8     Past Medical History:   Diagnosis Date    Hand pain, right      Past Surgical History:   Procedure Laterality Date     SECTION         PTA Medications   Medication Sig    ferrous sulfate (FEOSOL) 325 mg (65 mg iron) Tab tablet TK 1 T PO BID    PNV,calcium 72-iron-folic acid (PRENATAL VITAMIN PLUS LOW IRON) 27 mg iron- 1 mg Tab Take 1 tablet (1 each total) by mouth once daily.    promethazine (PHENERGAN) 25 MG tablet Take 1 tablet (25 mg total) by mouth every 4 (four) hours.       Review of patient's allergies indicates:  No Known Allergies     Family History     None        Tobacco Use    Smoking status: Never Smoker    Smokeless tobacco: Never Used   Substance and Sexual Activity    Alcohol use: Not Currently     Alcohol/week: 0.0 standard drinks     Comment:  occasional    Drug use: No    Sexual activity: Yes     Partners: Male     Birth control/protection: None     Review of Systems   All other systems reviewed and are negative.     Objective:     Vital Signs (Most Recent):  Temp: 98.3 °F (36.8 °C) (20 1546)  Pulse: 83 (20 1546)  Resp: 18 (20 1233)  BP: 135/73 (20 1546)  SpO2: 100 % (20 1232) Vital Signs (24h Range):  Temp:  [96.3 °F (35.7 °C)-98.3 °F (36.8 °C)] 98.3 °F (36.8 °C)  Pulse:  [] 83  Resp:  [16-18] 18  SpO2:  [95 %-100 %] 100 %  BP: (109-149)/(66-97) 135/73     Weight: 124.3 kg (274 lb)  Body mass index is 45.6 kg/m².    FHT: 130 x 2   Physical Exam:   Constitutional: She is oriented to person, place, and time. She appears well-developed and well-nourished.        Pulmonary/Chest: Effort normal.        Abdominal: Soft. There is no tenderness.   gravid             Musculoskeletal: Normal range of motion.       Neurological: She is alert and oriented to person, place, and time.    Skin: Skin is warm and dry.    Psychiatric: She has a normal mood and affect. Her behavior is normal.       Significant Labs:  Lab Results   Component Value Date    GROUPTRH A POS 2020    HEPBSAG Negative 2019    STREPBCULT (A) 2020     STREPTOCOCCUS AGALACTIAE (GROUP B)  In case of Penicillin allergy, call lab for further testing.  Beta-hemolytic streptococci are routinely susceptible to   penicillins,cephalosporins and carbapenems.         I have personallly reviewed all pertinent lab results from the last 24 hours.    Assessment/Plan:     25 y.o. female  at 38w0d for:    SPROM (prolonged spontaneous rupture of membranes)          Dichorionic diamniotic twin pregnancy in third trimester          Previous  section  Desires repeat  to OR for repeat csection    Gayla Jimenes MD  Obstetrics  [facility] [place]

## 2020-05-28 NOTE — PROGRESS NOTES
Nitrazine test positive; pt instructed on hibiclens shower.  Pt up to bathroom at this time for shower.  COVID rapid obtained.     Discussed feeding choice with mother.  Reviewed benefits of breastfeeding and risks of formula feeding. Mother states her intention is to breastfeed.

## 2020-05-28 NOTE — HPI
Roger Juarez 25 y.o.  with di-di twin gestation at 38w0d presents for SROM. History of prior csection, desires repeat

## 2020-05-28 NOTE — SUBJECTIVE & OBJECTIVE
Obstetric HPI:  Patient reports few contractions, active fetal movement, No vaginal bleeding , Yes loss of fluid     This pregnancy has been complicated by previous csection    OB History    Para Term  AB Living   2 2 2 0 0 3   SAB TAB Ectopic Multiple Live Births   0 0 0 1 3      # Outcome Date GA Lbr Miguel/2nd Weight Sex Delivery Anes PTL Lv   2A Term 20 38w0d  3.47 kg (7 lb 10.4 oz) M CS-LTranv Spinal N NATALI      Name: ANGELLA SHELBY BOY JAMETRIA      Apgar1: 9  Apgar5: 9   2B Term 20 38w0d  2.76 kg (6 lb 1.4 oz) F CS-LTranv Spinal N NATALI      Name: TRINO SHELBY GIRL JAMETRIA      Apgar1: 1  Apgar5: 8   1 Term 16 38w6d  3.124 kg (6 lb 14.2 oz) M CS-LTranv EPI N NATALI      Complications: Fetal Intolerance      Apgar1: 4  Apgar5: 8     Past Medical History:   Diagnosis Date    Hand pain, right      Past Surgical History:   Procedure Laterality Date     SECTION         PTA Medications   Medication Sig    ferrous sulfate (FEOSOL) 325 mg (65 mg iron) Tab tablet TK 1 T PO BID    PNV,calcium 72-iron-folic acid (PRENATAL VITAMIN PLUS LOW IRON) 27 mg iron- 1 mg Tab Take 1 tablet (1 each total) by mouth once daily.    promethazine (PHENERGAN) 25 MG tablet Take 1 tablet (25 mg total) by mouth every 4 (four) hours.       Review of patient's allergies indicates:  No Known Allergies     Family History     None        Tobacco Use    Smoking status: Never Smoker    Smokeless tobacco: Never Used   Substance and Sexual Activity    Alcohol use: Not Currently     Alcohol/week: 0.0 standard drinks     Comment: occasional    Drug use: No    Sexual activity: Yes     Partners: Male     Birth control/protection: None     Review of Systems   All other systems reviewed and are negative.     Objective:     Vital Signs (Most Recent):  Temp: 98.3 °F (36.8 °C) (20 1546)  Pulse: 83 (20 1546)  Resp: 18 (20 1233)  BP: 135/73 (20 1546)  SpO2: 100 % (20 1232) Vital Signs (24h  Range):  Temp:  [96.3 °F (35.7 °C)-98.3 °F (36.8 °C)] 98.3 °F (36.8 °C)  Pulse:  [] 83  Resp:  [16-18] 18  SpO2:  [95 %-100 %] 100 %  BP: (109-149)/(66-97) 135/73     Weight: 124.3 kg (274 lb)  Body mass index is 45.6 kg/m².    FHT: 130 x 2   Physical Exam:   Constitutional: She is oriented to person, place, and time. She appears well-developed and well-nourished.        Pulmonary/Chest: Effort normal.        Abdominal: Soft. There is no tenderness.   gravid             Musculoskeletal: Normal range of motion.       Neurological: She is alert and oriented to person, place, and time.    Skin: Skin is warm and dry.    Psychiatric: She has a normal mood and affect. Her behavior is normal.       Significant Labs:  Lab Results   Component Value Date    GROUPTRH A POS 05/28/2020    HEPBSAG Negative 12/12/2019    STREPBCULT (A) 05/14/2020     STREPTOCOCCUS AGALACTIAE (GROUP B)  In case of Penicillin allergy, call lab for further testing.  Beta-hemolytic streptococci are routinely susceptible to   penicillins,cephalosporins and carbapenems.         I have personallly reviewed all pertinent lab results from the last 24 hours.

## 2020-05-28 NOTE — LACTATION NOTE
Lactation Rounds:     Visited mother at bedside. Both infants were positioned in football holds on either side latched with assistance from transition nurse. Infants were actively suckling with occasional swallows observed. Mother denied pain with latch, but overall she was uncomfortable s/p C/S. Additional support for arms added.     Admit teaching done, including feeding on demand, recognition of feeding cues, expectations for infant feeding/behavior in first day of life, importance of skin to skin contact, hand expression, risks of artificial nipples and pacifiers, risks of non-medically indicated formula supplementation. Mother verbalized knowledge of hand expression, but it was not performed at this visit due to active feeding.     Infants occasionally released breast and needed to be relatched. Mother required full assist with relatching infants, but infants were eager to feed and this was accomplished with minimal effort. Infant were still suckling at end of encounter. FOB present at bedside for safety of mother and infants. Lactation phone number was provided with encouragement to call with any questions or concerns or for observation of/assistance with next feeding.

## 2020-05-28 NOTE — HOSPITAL COURSE
Pt presented with SROM at 38 weeks EGA.  Patient underwent repeat  with out complications.  Normal post op course.

## 2020-05-28 NOTE — ANESTHESIA PROCEDURE NOTES
CSE    Patient location during procedure: OB    Staffing  Authorizing Provider: Angeli De La Cruz MD  Performing Provider: Amy Holstein, CRNA    Preanesthetic Checklist  Completed: patient identified, site marked, surgical consent, pre-op evaluation, timeout performed, IV checked, risks and benefits discussed and monitors and equipment checked  CSE  Patient position: sitting  Prep: Betadine  Patient monitoring: heart rate, frequent blood pressure checks and continuous pulse ox  Approach: midline  Spinal Needle  Needle type: pencil-tip   Needle gauge: 25 G  Needle length: 3.5 in  Epidural Needle  Injection technique: MARIPOSA saline  Needle type: Tuohy   Needle gauge: 17 G  Needle length: 3.5 in  Needle insertion depth: 7 cm  Location: L4-5  Needle localization: anatomical landmarks  Assessment  Sensory level: T4   Dermatomal levels determined by alcohol swab  Intrathecal Medications:  administered: primary anesthetic mcg of    Additional Notes  Epidural cathater not threaded, just used to find dural space

## 2020-05-28 NOTE — LACTATION NOTE
"Lactation Rounds:     Visited mother at bedside. She was attempting to rouse baby girl B (Taniya) for feeding. Baby boy A (Marco Antonio) was sleepy but placed skin to skin with FOB at encouragement of bedside nurse.    Assisted mother to position Taniya in right football hold with pillow support. Taniya did not open her mouth with wide gape in this hold. She was smacking on her own lips and biting with suck assessment. Demonstrated suck training to mother. Suck pattern was uncoordinated but improved slightly with suck training.     Assisted mother to reposition Taniya in right cross-cradle hold. Taniya was slightly more alert in this position, and mother made latch attempts with good technique until latch was achieved. Mother denied pain with latch. Discussed frequent stimulation to keep infant active at the breast. Reviewed importance of feeding infants on cue, or waking babies if it has been 3.5-4 hours since last feeding. Reviewed hand expression. [employer] "Attaching Your Baby at the Breast" video provided for reinforcement of education and mother was encouraged to call as needed.   "

## 2020-05-28 NOTE — ANESTHESIA POSTPROCEDURE EVALUATION
Anesthesia Post Evaluation    Patient: Roger Juarez    Procedure(s) Performed: Procedure(s) (LRB):   SECTION (N/A)    Final Anesthesia Type: spinal    Patient location during evaluation: labor & delivery  Patient participation: Yes- Able to Participate  Level of consciousness: awake and alert and awake  Post-procedure vital signs: reviewed and stable  Pain management: adequate  Airway patency: patent    PONV status at discharge: No PONV  Anesthetic complications: no      Cardiovascular status: blood pressure returned to baseline and hemodynamically stable  Respiratory status: unassisted and spontaneous ventilation  Hydration status: euvolemic  Follow-up not needed.          Vitals Value Taken Time   /73 2020 11:48 AM   Temp 36.7 °C (98.1 °F) 2020  8:17 AM   Pulse 82 2020 11:52 AM   Resp 18 2020  8:17 AM   SpO2 99 % 2020 11:52 AM         No case tracking events are documented in the log.      Pain/Adalberto Score: No data recorded

## 2020-05-28 NOTE — ANESTHESIA PREPROCEDURE EVALUATION
2020  Roger Juarez is a 25 y.o., female.    Anesthesia Evaluation    I have reviewed the Patient Summary Reports.      I have reviewed the Medications.     Review of Systems  Anesthesia Hx:  No previous Anesthesia  Denies Family Hx of Anesthesia complications.   Denies Personal Hx of Anesthesia complications.   Social:  Non-Smoker, No Alcohol Use    Hematology/Oncology:  Hematology Normal   Oncology Normal     EENT/Dental:EENT/Dental Normal   Cardiovascular:  Cardiovascular Normal     Pulmonary:  Pulmonary Normal    Renal/:  Renal/ Normal     Hepatic/GI:  Hepatic/GI Normal    Musculoskeletal:  Musculoskeletal Normal    OB/GYN/PEDS:  Planned Repeat  Repeat  is for previous , elective repeat. ,  class C - Scheduled (non-urgent).  Denies Issues with Current Pregnancy  Denies Problems with Previous Pregnancy / Delivery  Neuraxial Anesthesia - Previous History of no problems with epidural    Neurological:  Neurology Normal    Endocrine:  Endocrine Normal    Dermatological:  Skin Normal    Psych:  Psychiatric Normal           Physical Exam  General:  Well nourished    Airway/Jaw/Neck:  Airway Findings: Mouth Opening: Normal Tongue: Normal  General Airway Assessment: Adult  Mallampati: II  TM Distance: 4 - 6 cm  Jaw/Neck Findings:  Neck ROM: Normal ROM      Dental:  Dental Findings: In tact   Chest/Lungs:  Chest/Lungs Findings: Clear to auscultation, Normal Respiratory Rate     Heart/Vascular:  Heart Findings: Rate: Normal  Rhythm: Regular Rhythm  Sounds: Normal        Mental Status:  Mental Status Findings:  Cooperative, Alert and Oriented         Anesthesia Plan  Type of Anesthesia, risks & benefits discussed:  Anesthesia Type:  epidural  Patient's Preference:   Intra-op Monitoring Plan: standard ASA monitors  Intra-op Monitoring Plan Comments:   Post Op  Pain Control Plan: per primary service following discharge from PACU  Post Op Pain Control Plan Comments:   Induction:    Beta Blocker:  Patient is not currently on a Beta-Blocker (No further documentation required).       Informed Consent: Patient understands risks and agrees with Anesthesia plan.  Questions answered. Anesthesia consent signed with patient.  ASA Score: 2     Day of Surgery Review of History & Physical: I have interviewed and examined the patient. I have reviewed the patient's H&P dated:            Ready For Surgery From Anesthesia Perspective.

## 2020-05-29 PROBLEM — Z98.891 S/P REPEAT LOW TRANSVERSE C-SECTION: Status: ACTIVE | Noted: 2020-05-29

## 2020-05-29 PROCEDURE — 99231 PR SUBSEQUENT HOSPITAL CARE,LEVL I: ICD-10-PCS | Mod: ,,, | Performed by: OBSTETRICS & GYNECOLOGY

## 2020-05-29 PROCEDURE — 25000003 PHARM REV CODE 250: Performed by: OBSTETRICS & GYNECOLOGY

## 2020-05-29 PROCEDURE — 63600175 PHARM REV CODE 636 W HCPCS: Performed by: OBSTETRICS & GYNECOLOGY

## 2020-05-29 PROCEDURE — 99231 SBSQ HOSP IP/OBS SF/LOW 25: CPT | Mod: ,,, | Performed by: OBSTETRICS & GYNECOLOGY

## 2020-05-29 PROCEDURE — 11000001 HC ACUTE MED/SURG PRIVATE ROOM

## 2020-05-29 RX ADMIN — CHLORHEXIDINE GLUCONATE 0.12% ORAL RINSE 10 ML: 1.2 LIQUID ORAL at 08:05

## 2020-05-29 RX ADMIN — IBUPROFEN 800 MG: 800 TABLET, FILM COATED ORAL at 10:05

## 2020-05-29 RX ADMIN — KETOROLAC TROMETHAMINE 30 MG: 30 INJECTION, SOLUTION INTRAMUSCULAR; INTRAVENOUS at 05:05

## 2020-05-29 RX ADMIN — CHLORHEXIDINE GLUCONATE 0.12% ORAL RINSE 10 ML: 1.2 LIQUID ORAL at 10:05

## 2020-05-29 RX ADMIN — IBUPROFEN 800 MG: 800 TABLET, FILM COATED ORAL at 01:05

## 2020-05-29 NOTE — LACTATION NOTE
05/29/20 1000   Maternal Assessment   Breast Shape Bilateral:;tubular;other (see comments)  (widely spaced)   Breast Density Bilateral:;soft   Areola Bilateral:;elastic   Nipples Bilateral:;everted   Maternal Infant Feeding   Maternal Emotional State relaxed   Infant Positioning clutch/football  (right breast)   Signs of Milk Transfer other (see comments)  (no latch acheived with baby b)

## 2020-05-29 NOTE — LACTATION NOTE
"Lactation Rounds: both infant's with adequate output and weight loss WNL. Mother states infant's fed well over night. Infant's resting on mother, clothed. Mother states she attempted to feed infant's at 0900, baby A did not latch and last fed at 0300, baby B latched at 0900 for 10 minutes, mother states "I don't know if she fed enough, I heard swallows though."    Encouraged skin to skin, assisted mother in placing both infant's skin to skin. Baby A remains asleep, baby B begins to show feeding cues. Baby B placed to the right breast in the football position, occasionally roots and falls asleep, after 10 minutes of attempting infant falls asleep. Baby B suck assessment during feeding attempt show that at this time infant only bites and thrusts tongue, no seal and suck achieved.      Mother and I both attempt to hand express right breast and milk glistens at the breast, no drop(s) expressed. Mother has widely spaced tubular breast with areolas that are as wide as the breast; mother reports breast tenderness throughout pregnancy, mother did breastfeed last child for 6 weeks.     Instructed mother to keep baby A skin to skin and to call lactation when feeding cues arise or if infant has no feeding cues by 1100. Encouraged mother to keep baby B skin to skin as well.     Discussed expected feeding and output pattern for days of life 2. Encouraged unrestricted access to the breast, frequent skin to skin contact and feeds based on early feeding cues.    Mother to call with feeding cues or if baby A does not show cues by 1100. Plan of care will be updated with further assessment at next feeding.    Primary nurse updated.  "

## 2020-05-29 NOTE — LACTATION NOTE
Lactation called back to room, baby A remains skin to skin with no feeding cues. Assisted mother in positioning infant to the left breast in the football position, mother attempts to latch infant but no latch obtained due to infant being asleep. Suck exercise preformed in attempts to interest infant, no success.     Infant last fed 5.5 hours ago. Mother hand expresses both breast and yields a total of 1 drop of EBM which was finger fed. Mother agrees to supplement infant with formula, as medically indicated at this moment. Discusses risks and implications of artifical nipples, encouraged the use of alternative feeding methods. Mother chooses to supplement formula via syringe. Safe syringe feeding technique demonstrated, mother able to return demonstrate.    Baby A last fed at 0900, instructed mother to place her skin to skin at 1200.     If infants do not become active and feed effectively at the breast with the next feeding, will encouraged mother to pump, hand express, supplement with all available EBM via alterative feeding method and use formula if infants are unsatisfied and/or insufficient volumes of EBM. Mother agrees to this plan if needed in the future.    Mother and father verbalize understanding of all education and instructions. Mother to call for assistance when either infant begins to show feeding cues and/or if baby B has not showing cues by 1300.       05/29/20 1115   Maternal Infant Feeding   Maternal Emotional State relaxed   Infant Positioning clutch/football  (left breast, baby A)   Signs of Milk Transfer other (see comments)  (no latch acheived)

## 2020-05-29 NOTE — PROGRESS NOTES
Spoke with Dr. Quinn about RPR lab that was not collected yesterday. She stated that it does not need to be done. Will kenisha.

## 2020-05-29 NOTE — SUBJECTIVE & OBJECTIVE
Interval History:   Patient doing well today.  No complaints.  Pain controlled on oral meds.  Ambulating, voiding, and tolerating po.  Vaginal bleeding within normal limits.    Objective:     Vital Signs (Most Recent):  Temp: 98.2 °F (36.8 °C) (05/29/20 0755)  Pulse: 84 (05/29/20 0755)  Resp: 18 (05/29/20 0755)  BP: 112/68 (05/29/20 0755)  SpO2: 100 % (05/28/20 1232) Vital Signs (24h Range):  Temp:  [96.3 °F (35.7 °C)-99.4 °F (37.4 °C)] 98.2 °F (36.8 °C)  Pulse:  [71-89] 84  Resp:  [16-18] 18  SpO2:  [99 %-100 %] 100 %  BP: (109-146)/(63-93) 112/68     Weight: 124.3 kg (274 lb)  Body mass index is 45.6 kg/m².      Intake/Output Summary (Last 24 hours) at 5/29/2020 0904  Last data filed at 5/29/2020 0400  Gross per 24 hour   Intake 1000 ml   Output 1635 ml   Net -635 ml           Significant Labs:  Lab Results   Component Value Date    GROUPTRH A POS 05/28/2020    HEPBSAG Negative 12/12/2019    STREPBCULT (A) 05/14/2020     STREPTOCOCCUS AGALACTIAE (GROUP B)  In case of Penicillin allergy, call lab for further testing.  Beta-hemolytic streptococci are routinely susceptible to   penicillins,cephalosporins and carbapenems.       Recent Labs   Lab 05/28/20  0900   HGB 11.6*   HCT 37.7       I have personallly reviewed all pertinent lab results from the last 24 hours.    Physical Exam:   Constitutional: She is oriented to person, place, and time. She appears well-developed and well-nourished. No distress.      Neck: Neck supple.    Cardiovascular: Normal rate.     Pulmonary/Chest: Effort normal.        Abdominal: Soft. She exhibits no distension. There is no tenderness.             Musculoskeletal: She exhibits no edema or tenderness.       Neurological: She is alert and oriented to person, place, and time.     Psychiatric: She has a normal mood and affect.

## 2020-05-29 NOTE — LACTATION NOTE
Lactation to room. Mother states she was not able to latch baby B to the breast so she syringe fed formula until infant was satisfied.     Due to infants not feeding well at the breast, pumping is indicated to promote and maintain adequate milk supply. [employer] pump, tubing and collections containers brought to bedside.  Discussed proper pump setting of initiation phase.  Instructed on proper usage of pump and to adjust suction according to maximum comfort level.  Verified appropriate flange fit, 24 mm bilaterally.  Educated on the frequency and duration of pumping in order to promote and maintain a full milk supply.  Hands on pumping technique reviewed.  Encouraged hand expression after pumping.  Instructed on cleaning of breast pump parts.  Written instructions also given.  Pt verbalized understanding and appropriate recall for proper milk handling, collection, labeling, storage and transportation. Mother begins pumping now. Mother has a manual pump at home, resources given to obtain pump through insurance.    Feeding plan developed and reviewed, written plan provided, parents able to teach back.  Plan:  · 1. Feed based on feeding cues.  · 2. Skin to skin every 2-3 hours if no feeding cues.  · 3. Notify bedside nurse if no feeding 3 hours from beginning of last feeding.  · 4. Attempt feeding baby for 10-15 minutes. If feeding is not nutritive;   § Supplement with all expressed breast milk available (from previous pumping/hand expression session).  § Hand express and collect all available colustrum for baby, save for next feeding.  · 5. Clean breast pump kit.  · Repeat all of the above with each feeding session.        Expected oral intake per feeding (according to [employer] Breastfeeding [employer]) & expected output for each day of life:  Day 2: 5-15 mL per feeding, 2 voids, 2 stools  Day 3: 15-30 mL per feeding, 3 voids, 3 stools  Day 4: 30-60 mL per feeding, 4 voids, 3 stools     Consider  Outpatient Lactation Consult on day of life 4 or 5, call [phone] to schedule  Consider rental of hospital grade pump if primarily pumping for infants 1st month of life.     This might seems like a busy plan, but this plan allows us to feed the babies, and maintain milk supply!     · Feed the babies. A babies who is getting the right amount of calories and nutrition is best able to learn how to nurse. First choice for what to feed a non-nursing babies is moms own milk.   · Maintain milk supply. If moms milk supply is being maintained with an appropriate frequency and amount of milk expression, more time is available for baby to learn to nurse, and babies efforts will be better rewarded (with more milk).     Mother verbalizes understanding of all education and counseling. Mother denies any further needs or concerns at this time. Mother to call for assistance as needed. Primary nurse provided with full update.            05/29/20 3820   Equipment Type   Breast Pump Type double electric, hospital grade   Breast Pump Flange Type hard   Breast Pump Flange Size 24 mm  (bilaterally)   Breast Pumping   Breast Pumping Interventions post-feed pumping encouraged;frequent pumping encouraged   Breast Pumping double electric breast pump utilized

## 2020-05-29 NOTE — LACTATION NOTE
"Lactation Rounds:     Visited mother at bedside. She was making attempts to latch infant (baby girl twin B "Taniya") to breast in right football hold. Mother was able to independently latch infant after a couple of attempts and with verbal encouragement. She reported that Marco Antonio (baby boy twin A) had not been as consistent at his second feeding as he was at his first. Reviewed availability of hand expression as a tool to provide supplementary expressed milk as needed. Mother verbalized her understanding and denied further needs. Availability offered.   "

## 2020-05-29 NOTE — PLAN OF CARE
Plan of care reviewed. Fundus firm. Dressing CDI. Bonding with infant. Frequent checks made to ensure safety and comfort. Bed low, call bell within reach. Will continue to monitor.

## 2020-05-29 NOTE — PLAN OF CARE
Patient afebrile and had no falls this shift. Fundus firm without massage and below umbilicus. Bleeding light, no clots passed this shift. Ambulates independently. Pain well controlled with oral pain medication. Vital signs stable at this time. Bonding well with infant; responds to infant cues and participates in infant care. Will continue to monitor.

## 2020-05-29 NOTE — PROGRESS NOTES
[facility] -   Obstetrics  Postpartum Progress Note    Patient Name: Roger Juarez  MRN: [id number]  Admission Date: 2020  Hospital Length of Stay: 1 days  Attending Physician: Gayla Jimenes MD  Primary Care Provider: Primary Doctor No    Subjective:     Principal Problem:S/P repeat low transverse     Hospital Course:  Pt presented with SROM at 38 weeks EGA.  Patient underwent repeat  with out complications.  Normal post op course.    Interval History:   Patient doing well today.  No complaints.  Pain controlled on oral meds.  Ambulating, voiding, and tolerating po.  Vaginal bleeding within normal limits.    Objective:     Vital Signs (Most Recent):  Temp: 98.2 °F (36.8 °C) (20 0755)  Pulse: 84 (20 0755)  Resp: 18 (20 0755)  BP: 112/68 (20 0755)  SpO2: 100 % (20 1232) Vital Signs (24h Range):  Temp:  [96.3 °F (35.7 °C)-99.4 °F (37.4 °C)] 98.2 °F (36.8 °C)  Pulse:  [71-89] 84  Resp:  [16-18] 18  SpO2:  [99 %-100 %] 100 %  BP: (109-146)/(63-93) 112/68     Weight: 124.3 kg (274 lb)  Body mass index is 45.6 kg/m².      Intake/Output Summary (Last 24 hours) at 2020 0904  Last data filed at 2020 0400  Gross per 24 hour   Intake 1000 ml   Output 1635 ml   Net -635 ml           Significant Labs:  Lab Results   Component Value Date    GROUPTRH A POS 2020    HEPBSAG Negative 2019    STREPBCULT (A) 2020     STREPTOCOCCUS AGALACTIAE (GROUP B)  In case of Penicillin allergy, call lab for further testing.  Beta-hemolytic streptococci are routinely susceptible to   penicillins,cephalosporins and carbapenems.       Recent Labs   Lab 20  0900   HGB 11.6*   HCT 37.7       I have personallly reviewed all pertinent lab results from the last 24 hours.    Physical Exam:   Constitutional: She is oriented to person, place, and time. She appears well-developed and well-nourished. No distress.      Neck: Neck supple.     Cardiovascular: Normal rate.     Pulmonary/Chest: Effort normal.        Abdominal: Soft. She exhibits no distension. There is no tenderness.             Musculoskeletal: She exhibits no edema or tenderness.       Neurological: She is alert and oriented to person, place, and time.     Psychiatric: She has a normal mood and affect.       Assessment/Plan:     25 y.o. female  for:    * S/P repeat low transverse   POD#1 s/p repeat , twins  Stable and doing well    SPROM (prolonged spontaneous rupture of membranes)          Dichorionic diamniotic twin pregnancy in third trimester          Previous  section  Desires repeat        Disposition: As patient meets milestones, will plan to discharge tomorrow.    Kandi Quinn MD  Obstetrics  [facility]

## 2020-05-30 VITALS
SYSTOLIC BLOOD PRESSURE: 144 MMHG | WEIGHT: 274 LBS | RESPIRATION RATE: 16 BRPM | TEMPERATURE: 98 F | OXYGEN SATURATION: 100 % | DIASTOLIC BLOOD PRESSURE: 89 MMHG | HEART RATE: 89 BPM | BODY MASS INDEX: 45.6 KG/M2

## 2020-05-30 PROCEDURE — 25000003 PHARM REV CODE 250: Performed by: OBSTETRICS & GYNECOLOGY

## 2020-05-30 PROCEDURE — 99238 HOSP IP/OBS DSCHRG MGMT 30/<: CPT | Mod: ,,, | Performed by: OBSTETRICS & GYNECOLOGY

## 2020-05-30 PROCEDURE — 99238 PR HOSPITAL DISCHARGE DAY,<30 MIN: ICD-10-PCS | Mod: ,,, | Performed by: OBSTETRICS & GYNECOLOGY

## 2020-05-30 RX ORDER — IBUPROFEN 600 MG/1
600 TABLET ORAL EVERY 8 HOURS PRN
Qty: 30 TABLET | Refills: 1 | Status: SHIPPED | OUTPATIENT
Start: 2020-05-30 | End: 2021-05-30

## 2020-05-30 RX ORDER — CHLORHEXIDINE GLUCONATE ORAL RINSE 1.2 MG/ML
10 SOLUTION DENTAL 2 TIMES DAILY
Status: CANCELLED | OUTPATIENT
Start: 2020-05-30 | End: 2020-06-04

## 2020-05-30 RX ORDER — HYDROCODONE BITARTRATE AND ACETAMINOPHEN 5; 325 MG/1; MG/1
1 TABLET ORAL EVERY 8 HOURS PRN
Qty: 10 TABLET | Refills: 0 | Status: SHIPPED | OUTPATIENT
Start: 2020-05-30 | End: 2023-06-16

## 2020-05-30 RX ADMIN — IBUPROFEN 800 MG: 800 TABLET, FILM COATED ORAL at 05:05

## 2020-05-30 RX ADMIN — HYDROCODONE BITARTRATE AND ACETAMINOPHEN 1 TABLET: 10; 325 TABLET ORAL at 01:05

## 2020-05-30 NOTE — SUBJECTIVE & OBJECTIVE
Interval History:      She is doing well this morning. She is tolerating a regular diet without nausea or vomiting. She is voiding spontaneously. She is ambulating. She has passed flatus, and has not a BM. Vaginal bleeding is mild. She denies fever or chills. Abdominal pain is mild and controlled with oral medications. She is breastfeeding. She desires circumcision for her male baby: yes. For the boy twin.    No headache, vision change, or right upper quadrant pain.        Objective:     Vital Signs (Most Recent):  Temp: 97.7 °F (36.5 °C) (05/30/20 0814)  Pulse: 89 (05/30/20 0814)  Resp: 16 (05/30/20 0814)  BP: (!) 144/89 (05/30/20 0814)  SpO2: 100 % (05/30/20 0814) Vital Signs (24h Range):  Temp:  [97.7 °F (36.5 °C)-98.5 °F (36.9 °C)] 97.7 °F (36.5 °C)  Pulse:  [72-89] 89  Resp:  [16-20] 16  SpO2:  [97 %-100 %] 100 %  BP: (123-144)/(65-89) 144/89     Weight: 124.3 kg (274 lb)  Body mass index is 45.6 kg/m².      Intake/Output Summary (Last 24 hours) at 5/30/2020 0852  Last data filed at 5/29/2020 1600  Gross per 24 hour   Intake --   Output 650 ml   Net -650 ml           Significant Labs:  Lab Results   Component Value Date    GROUPTRH A POS 05/28/2020    HEPBSAG Negative 12/12/2019    STREPBCULT (A) 05/14/2020     STREPTOCOCCUS AGALACTIAE (GROUP B)  In case of Penicillin allergy, call lab for further testing.  Beta-hemolytic streptococci are routinely susceptible to   penicillins,cephalosporins and carbapenems.       Recent Labs   Lab 05/28/20  0900   HGB 11.6*   HCT 37.7       I have personallly reviewed all pertinent lab results from the last 24 hours.    Physical Exam:   Constitutional: She is oriented to person, place, and time. She appears well-developed and well-nourished.    HENT:   Head: Normocephalic.     Neck: Normal range of motion. No thyromegaly present.     Pulmonary/Chest: Effort normal. Right breast exhibits no mass, no tenderness and no swelling. Left breast exhibits no mass, no tenderness and no  swelling.   No breast engorgement        Abdominal: Soft. She exhibits no distension. There is no tenderness (uterine fundus firm below umbilicus and appropriately tender for post delivery).   Aquacel dressing intact and dry     Genitourinary:   Genitourinary Comments: Lochia within normal           Musculoskeletal: Normal range of motion and moves all extremeties.   No calf tenderness or Sincere's sign.       Neurological: She is alert and oriented to person, place, and time. She has normal reflexes.    Skin: Skin is warm and dry.    Psychiatric: She has a normal mood and affect.

## 2020-05-30 NOTE — LACTATION NOTE
Lactation rounds:     Breastfeeding discharge education performed. Informed mother of the World Health Organization's recommendation for exclusive breastfeeding for the first 6 months of baby's life and continued breastfeeding after the introduction of solid foods for 2 years and beyond. Also informed mother of the American Academy of Pediatrics' recommendation for baby to be examined by pediatrician or other qualified HCP within 2-4 dys of discharge and again at the 2nd week of life. Discussed baby's appropriate intake and output, adequate weight gain patterns for baby, and how to seek the assistance of a qualified healthcare professional for concerns related to  feeding. Written instructions have been provided and were reviewed at this time. Mother voices understanding.    Feeding plan for infants once home is to place one twin to breast, follow with supplementation if needed, place second twin to breast, follow with supplementation if needed, then pump. Mother states that her pump should arrive Monday or Tuesday.  Discussed the option to rent hospital pump, mother declined. Reviewed use of manual pump. Encouraged to call if she decides to rent, verbalized understanding.

## 2020-05-30 NOTE — DISCHARGE INSTRUCTIONS
Mother Self Care:    Activity: Avoid strenuous exercise and get adequate rest.  No driving until your physician gives you consent.  Emotional Changes: The grieving process has many different stages, be prepared to experience lots of emotional ups and downs. Identify people to be your support system, and do not hesitate to call our  if you need someone to talk to.   Breast Care: You may notice milk leaking from your breasts. Wear a support bra 24 hours a day for one week or wrap breasts in an ace bandage if needed to stop milk production.  Avoid stimulation to breasts.  You may use ice packs for discomfort.  Joseph-Care/Vaginal Bleeding: Remember to use your joseph-bottle after urinating.  Your flow will change from red, to pink, to yellow/white color over a period of 2 weeks.  Menstruation will return in 3-8 weeks.  Episiotomy Vaginal Delivery: Stitches will dissolve within 10 days to 3 weeks.  Warm baths, tucks, and dermoplast spray will promote healing.  Avoid bubble baths or strong soaps.   Section/Tubal Ligation: Keep incision clean and dry.  Please remove steri-strips in 5-7 days.  You may shower, but avoid baths.  Sexual Activity/Pelvic Rest: No sexual activity, tampons, or douching until your physician gives you consent.  Diet: Continue to eat from the five basic food groups, including plenty of protein, fruits, vegetables, and whole grains.  Limit empty calories and high fat foods.  Drink enough fluids to satisfy thirst.  Constipation/Hemorrhoids: Drink plenty of water.  You may take a stool softener or natural laxative (Metamucil). You may use tucks or hemorrhoid ointment and soak in a warm tub.    CALL YOUR OB DOCTOR IF ANY OF THE FOLLOWING OCCURS:  *Heavy bleeding - saturating a pad an hour or passing any large (2-3 inches in size) blood clots.  *Any pain, redness, or tenderness in lower leg.  *You cannot care for yourself  *Any signs of infection-      - Temperature greater than 100.5  degrees F      - Foul smelling vaginal discharge and/or incisional drainage      - Increased episiotomy or incisional pain      - Hot, hard, red or sore area on breast      - Flu-like symptoms      - Any urgency, frequency or burning with urination

## 2020-05-30 NOTE — PROGRESS NOTES
[facility] -   Obstetrics  Postpartum Progress Note    Patient Name: Roger Juarez  MRN: [id number]  Admission Date: 2020  Hospital Length of Stay: 2 days  Attending Physician: Gayla Jimenes MD  Primary Care Provider: Primary Doctor No    Subjective:     Principal Problem:S/P repeat low transverse     Hospital Course:  Pt presented with SROM at 38 weeks EGA.  Patient underwent repeat  with out complications.  Normal post op course.     Interval History:      She is doing well this morning. She is tolerating a regular diet without nausea or vomiting. She is voiding spontaneously. She is ambulating. She has passed flatus, and has not a BM. Vaginal bleeding is mild. She denies fever or chills. Abdominal pain is mild and controlled with oral medications. She is breastfeeding. She desires circumcision for her male baby: yes. For the boy twin.    No headache, vision change, or right upper quadrant pain.        Objective:     Vital Signs (Most Recent):  Temp: 97.7 °F (36.5 °C) (20 08)  Pulse: 89 (20 08)  Resp: 16 (20)  BP: (!) 144/89 (20)  SpO2: 100 % (20) Vital Signs (24h Range):  Temp:  [97.7 °F (36.5 °C)-98.5 °F (36.9 °C)] 97.7 °F (36.5 °C)  Pulse:  [72-89] 89  Resp:  [16-20] 16  SpO2:  [97 %-100 %] 100 %  BP: (123-144)/(65-89) 144/89     Weight: 124.3 kg (274 lb)  Body mass index is 45.6 kg/m².      Intake/Output Summary (Last 24 hours) at 2020 0852  Last data filed at 2020 1600  Gross per 24 hour   Intake --   Output 650 ml   Net -650 ml           Significant Labs:  Lab Results   Component Value Date    GROUPTRH A POS 2020    HEPBSAG Negative 2019    STREPBCULT (A) 2020     STREPTOCOCCUS AGALACTIAE (GROUP B)  In case of Penicillin allergy, call lab for further testing.  Beta-hemolytic streptococci are routinely susceptible to   penicillins,cephalosporins and carbapenems.       Recent Labs   Lab  20  0900   HGB 11.6*   HCT 37.7       I have personallly reviewed all pertinent lab results from the last 24 hours.    Physical Exam:   Constitutional: She is oriented to person, place, and time. She appears well-developed and well-nourished.    HENT:   Head: Normocephalic.     Neck: Normal range of motion. No thyromegaly present.     Pulmonary/Chest: Effort normal. Right breast exhibits no mass, no tenderness and no swelling. Left breast exhibits no mass, no tenderness and no swelling.   No breast engorgement        Abdominal: Soft. She exhibits no distension. There is no tenderness (uterine fundus firm below umbilicus and appropriately tender for post delivery).   Aquacel dressing intact and dry     Genitourinary:   Genitourinary Comments: Lochia within normal           Musculoskeletal: Normal range of motion and moves all extremeties.   No calf tenderness or Sincere's sign.       Neurological: She is alert and oriented to person, place, and time. She has normal reflexes.    Skin: Skin is warm and dry.    Psychiatric: She has a normal mood and affect.       Assessment/Plan:     25 y.o. female  for:    * S/P repeat low transverse   POD#1 s/p repeat , twins  Stable and doing well    SPROM (prolonged spontaneous rupture of membranes)          Dichorionic diamniotic twin pregnancy in third trimester           Previous  section  Desires repeat         Disposition: As patient meets milestones, will plan to discharge  - pt desires to go home..    Marlyin Vora MD  Obstetrics  [facility]

## 2020-05-30 NOTE — DISCHARGE SUMMARY
[facility] -   Obstetrics  Discharge Summary      Patient Name: Roger Juarez  MRN: [id number]  Admission Date: 2020  Hospital Length of Stay: 2 days  Discharge Date and Time:  2020 10:57 AM  Attending Physician: Gayla Robbins MD   Discharging Provider: Marylin Vora MD   Primary Care Provider: Primary Doctor No    HPI: Roger Juarez 25 y.o.  with di-di twin gestation at 38w0d presents for SROM. History of prior csection, desires repeat         Procedure(s) (LRB):   SECTION (N/A)     Hospital Course:   Pt presented with SROM at 38 weeks EGA.  Patient underwent repeat  with out complications.  Normal post op course.      Consults (From admission, onward)        Status Ordering Provider     Inpatient consult to Anesthesiology  Once     Provider:  (Not yet assigned)    Acknowledged GAYLA ROBBINS          Final Active Diagnoses:    Diagnosis Date Noted POA    PRINCIPAL PROBLEM:  S/P repeat low transverse  [Z98.891] 2020 Not Applicable    Dichorionic diamniotic twin pregnancy in third trimester [O30.043] 2020 Yes    SPROM (prolonged spontaneous rupture of membranes) [O42.90] 2020 Yes    Previous  section [Z98.891] 2016 Not Applicable      Problems Resolved During this Admission:        Significant Diagnostic Studies:        Feeding Method: breast    Immunizations     Date Immunization Status Dose Route/Site Given by    20 1224 MMR Incomplete 0.5 mL Subcutaneous/Left deltoid     20 1224 Tdap Incomplete 0.5 mL Intramuscular/Left deltoid              ANGELLA Juarez [[id number]]     Delivery:    Episiotomy: None   Lacerations: None   Repair suture: None   Repair # of packets: 0   Blood loss (ml): 0     Birth information:  YOB: 2022   Time of birth: 10:50 AM   Sex: male   Delivery type: , Low Transverse   Gestational Age: 38w0d    Delivery Clinician:      Other  providers:       Additional  information:  Forceps:    Vacuum:    Breech:    Observed anomalies      Living?:           APGARS  One minute Five minutes Ten minutes   Skin color:         Heart rate:         Grimace:         Muscle tone:         Breathing:         Totals: 9  9        Placenta: Delivered:       appearance     TRINO Juarez [[id number]]     Delivery:    Episiotomy: None   Lacerations: None   Repair suture: None   Repair # of packets: 0   Blood loss (ml): 0     Birth information:  YOB: 2022   Time of birth: 10:52 AM   Sex: female   Delivery type: , Low Transverse   Gestational Age: 38w0d    Delivery Clinician:      Other providers:       Additional  information:  Forceps:    Vacuum:    Breech:    Observed anomalies      Living?:           APGARS  One minute Five minutes Ten minutes   Skin color:         Heart rate:         Grimace:         Muscle tone:         Breathing:         Totals: 1  8  9      Placenta: Delivered:       appearance    Pending Diagnostic Studies:     Procedure Component Value Units Date/Time    Specimen to Pathology, Surgery Gynecology and Obstetrics [[id number]] Collected:  20 1055    Order Status:  Sent Lab Status:  In process Updated:  20 1340          Discharged Condition: good    Disposition: Home or Self Care    Follow Up:  Follow-up Information     Gayla Jimenes MD On 2020.    Specialties:  Obstetrics, Obstetrics and Gynecology  Why:  [place] 10:15a postpartum  Contact information:  [address] [place] [address]  [phone]             [facility] In 1 week.    Why:  bandage removal               Patient Instructions:      Call MD for:  temperature >100.4     Call MD for:  persistent nausea and vomiting or diarrhea     Call MD for:  severe uncontrolled pain     Call MD for:  redness, tenderness, or signs of infection (pain, swelling, redness, odor or green/yellow discharge around incision site)     Call MD for:   difficulty breathing or increased cough     Call MD for:  severe persistent headache     Call MD for:  persistent dizziness, light-headedness, or visual disturbances     Call MD for:  increased confusion or weakness     Call MD for:   Order Comments: Return if any heavy bleeding that is more than the following:   soaking of 2 regular pads in an hours, 2 hours in a row.     Medications:  Current Discharge Medication List      START taking these medications    Details   HYDROcodone-acetaminophen (NORCO) 5-325 mg per tablet Take 1 tablet by mouth every 8 (eight) hours as needed for Pain (may alternate with ibuprofen).  Qty: 10 tablet, Refills: 0    Comments: Quantity prescribed more than 7 day supply? No      ibuprofen (ADVIL,MOTRIN) 600 MG tablet Take 1 tablet (600 mg total) by mouth every 8 (eight) hours as needed for Pain (take with food, and may alternate with norco).  Qty: 30 tablet, Refills: 1         CONTINUE these medications which have NOT CHANGED    Details   ferrous sulfate (FEOSOL) 325 mg (65 mg iron) Tab tablet TK 1 T PO BID      PNV,calcium 72-iron-folic acid (PRENATAL VITAMIN PLUS LOW IRON) 27 mg iron- 1 mg Tab Take 1 tablet (1 each total) by mouth once daily.  Qty: 30 tablet, Refills: 11    Comments: Any generic on plan may be used      promethazine (PHENERGAN) 25 MG tablet Take 1 tablet (25 mg total) by mouth every 4 (four) hours.  Qty: 20 tablet, Refills: 1             Marylin Vora MD  Obstetrics  [facility]

## 2020-06-01 LAB
FINAL PATHOLOGIC DIAGNOSIS: NORMAL
GROSS: NORMAL

## 2020-06-02 ENCOUNTER — HOSPITAL ENCOUNTER (OUTPATIENT)
Facility: HOSPITAL | Age: 25
Discharge: HOME | End: 2020-06-04
Attending: OBSTETRICS & GYNECOLOGY | Admitting: OBSTETRICS & GYNECOLOGY
Payer: MEDICAID

## 2020-06-02 ENCOUNTER — POSTPARTUM VISIT (OUTPATIENT)
Dept: OBSTETRICS AND GYNECOLOGY | Facility: CLINIC | Age: 25
End: 2020-06-02
Payer: MEDICAID

## 2020-06-02 VITALS
WEIGHT: 265.63 LBS | BODY MASS INDEX: 44.21 KG/M2 | DIASTOLIC BLOOD PRESSURE: 60 MMHG | SYSTOLIC BLOOD PRESSURE: 100 MMHG

## 2020-06-02 DIAGNOSIS — Z98.891 S/P REPEAT LOW TRANSVERSE C-SECTION: Primary | ICD-10-CM

## 2020-06-02 DIAGNOSIS — O30.043 DICHORIONIC DIAMNIOTIC TWIN PREGNANCY IN THIRD TRIMESTER: ICD-10-CM

## 2020-06-02 DIAGNOSIS — R03.0 ELEVATED BLOOD PRESSURE READING: ICD-10-CM

## 2020-06-02 LAB
ALBUMIN SERPL BCP-MCNC: 2.9 G/DL (ref 3.5–5.2)
ALP SERPL-CCNC: 113 U/L (ref 55–135)
ALT SERPL W/O P-5'-P-CCNC: 25 U/L (ref 10–44)
ANION GAP SERPL CALC-SCNC: 12 MMOL/L (ref 8–16)
AST SERPL-CCNC: 24 U/L (ref 10–40)
BASOPHILS # BLD AUTO: 0.02 K/UL (ref 0–0.2)
BASOPHILS NFR BLD: 0.4 % (ref 0–1.9)
BILIRUB SERPL-MCNC: 0.2 MG/DL (ref 0.1–1)
BUN SERPL-MCNC: 11 MG/DL (ref 6–20)
CALCIUM SERPL-MCNC: 8.9 MG/DL (ref 8.7–10.5)
CHLORIDE SERPL-SCNC: 109 MMOL/L (ref 95–110)
CO2 SERPL-SCNC: 20 MMOL/L (ref 23–29)
CREAT SERPL-MCNC: 0.7 MG/DL (ref 0.5–1.4)
CREAT UR-MCNC: 65.6 MG/DL (ref 15–325)
DIFFERENTIAL METHOD: ABNORMAL
EOSINOPHIL # BLD AUTO: 0.1 K/UL (ref 0–0.5)
EOSINOPHIL NFR BLD: 1.4 % (ref 0–8)
ERYTHROCYTE [DISTWIDTH] IN BLOOD BY AUTOMATED COUNT: 14.2 % (ref 11.5–14.5)
EST. GFR  (AFRICAN AMERICAN): >60 ML/MIN/1.73 M^2
EST. GFR  (NON AFRICAN AMERICAN): >60 ML/MIN/1.73 M^2
GLUCOSE SERPL-MCNC: 78 MG/DL (ref 70–110)
HCT VFR BLD AUTO: 34.3 % (ref 37–48.5)
HGB BLD-MCNC: 10.5 G/DL (ref 12–16)
IMM GRANULOCYTES # BLD AUTO: 0.01 K/UL (ref 0–0.04)
IMM GRANULOCYTES NFR BLD AUTO: 0.2 % (ref 0–0.5)
LYMPHOCYTES # BLD AUTO: 1.7 K/UL (ref 1–4.8)
LYMPHOCYTES NFR BLD: 32.7 % (ref 18–48)
MCH RBC QN AUTO: 25.5 PG (ref 27–31)
MCHC RBC AUTO-ENTMCNC: 30.6 G/DL (ref 32–36)
MCV RBC AUTO: 83 FL (ref 82–98)
MONOCYTES # BLD AUTO: 0.4 K/UL (ref 0.3–1)
MONOCYTES NFR BLD: 8.6 % (ref 4–15)
NEUTROPHILS # BLD AUTO: 2.9 K/UL (ref 1.8–7.7)
NEUTROPHILS NFR BLD: 56.7 % (ref 38–73)
NRBC BLD-RTO: 0 /100 WBC
PLATELET # BLD AUTO: 295 K/UL (ref 150–350)
PMV BLD AUTO: 11.3 FL (ref 9.2–12.9)
POTASSIUM SERPL-SCNC: 4.3 MMOL/L (ref 3.5–5.1)
PROT SERPL-MCNC: 7 G/DL (ref 6–8.4)
PROT UR-MCNC: 19 MG/DL (ref 0–15)
PROT/CREAT UR: 0.29 MG/G{CREAT} (ref 0–0.2)
RBC # BLD AUTO: 4.12 M/UL (ref 4–5.4)
SODIUM SERPL-SCNC: 141 MMOL/L (ref 136–145)
WBC # BLD AUTO: 5.13 K/UL (ref 3.9–12.7)

## 2020-06-02 PROCEDURE — 99212 OFFICE O/P EST SF 10 MIN: CPT | Mod: PBBFAC,25

## 2020-06-02 PROCEDURE — 84156 ASSAY OF PROTEIN URINE: CPT

## 2020-06-02 PROCEDURE — 63600175 PHARM REV CODE 636 W HCPCS: Performed by: ADVANCED PRACTICE MIDWIFE

## 2020-06-02 PROCEDURE — 80053 COMPREHEN METABOLIC PANEL: CPT

## 2020-06-02 PROCEDURE — 96365 THER/PROPH/DIAG IV INF INIT: CPT

## 2020-06-02 PROCEDURE — 96376 TX/PRO/DX INJ SAME DRUG ADON: CPT | Mod: 59

## 2020-06-02 PROCEDURE — 96360 HYDRATION IV INFUSION INIT: CPT

## 2020-06-02 PROCEDURE — 96366 THER/PROPH/DIAG IV INF ADDON: CPT

## 2020-06-02 PROCEDURE — 59430 PR CARE AFTER DELIVERY ONLY: ICD-10-PCS | Mod: ,,, | Performed by: OBSTETRICS & GYNECOLOGY

## 2020-06-02 PROCEDURE — 99999 PR PBB SHADOW E&M-EST. PATIENT-LVL II: CPT | Mod: PBBFAC,,,

## 2020-06-02 PROCEDURE — 85025 COMPLETE CBC W/AUTO DIFF WBC: CPT

## 2020-06-02 PROCEDURE — 25000003 PHARM REV CODE 250: Performed by: ADVANCED PRACTICE MIDWIFE

## 2020-06-02 PROCEDURE — 96361 HYDRATE IV INFUSION ADD-ON: CPT

## 2020-06-02 PROCEDURE — 51702 INSERT TEMP BLADDER CATH: CPT

## 2020-06-02 PROCEDURE — 99999 PR PBB SHADOW E&M-EST. PATIENT-LVL II: ICD-10-PCS | Mod: PBBFAC,,,

## 2020-06-02 RX ORDER — CEFAZOLIN SODIUM 2 G/50ML
2 SOLUTION INTRAVENOUS ONCE AS NEEDED
Status: DISCONTINUED | OUTPATIENT
Start: 2020-06-02 | End: 2020-06-02

## 2020-06-02 RX ORDER — MAGNESIUM SULFATE HEPTAHYDRATE 40 MG/ML
4 INJECTION, SOLUTION INTRAVENOUS ONCE
Status: COMPLETED | OUTPATIENT
Start: 2020-06-02 | End: 2020-06-02

## 2020-06-02 RX ORDER — CHLORHEXIDINE GLUCONATE ORAL RINSE 1.2 MG/ML
10 SOLUTION DENTAL
Status: CANCELLED | OUTPATIENT
Start: 2020-06-02

## 2020-06-02 RX ORDER — HYDRALAZINE HYDROCHLORIDE 20 MG/ML
10 INJECTION INTRAMUSCULAR; INTRAVENOUS ONCE AS NEEDED
Status: DISCONTINUED | OUTPATIENT
Start: 2020-06-02 | End: 2020-06-04 | Stop reason: HOSPADM

## 2020-06-02 RX ORDER — SODIUM CITRATE AND CITRIC ACID MONOHYDRATE 334; 500 MG/5ML; MG/5ML
30 SOLUTION ORAL
Status: DISCONTINUED | OUTPATIENT
Start: 2020-06-02 | End: 2020-06-02

## 2020-06-02 RX ORDER — MAGNESIUM SULFATE HEPTAHYDRATE 40 MG/ML
2 INJECTION, SOLUTION INTRAVENOUS CONTINUOUS
Status: DISCONTINUED | OUTPATIENT
Start: 2020-06-02 | End: 2020-06-04 | Stop reason: HOSPADM

## 2020-06-02 RX ORDER — BUTALBITAL, ACETAMINOPHEN AND CAFFEINE 50; 325; 40 MG/1; MG/1; MG/1
1 TABLET ORAL EVERY 4 HOURS PRN
Status: DISCONTINUED | OUTPATIENT
Start: 2020-06-02 | End: 2020-06-04 | Stop reason: HOSPADM

## 2020-06-02 RX ORDER — NIFEDIPINE 30 MG/1
30 TABLET, EXTENDED RELEASE ORAL DAILY
Status: DISCONTINUED | OUTPATIENT
Start: 2020-06-02 | End: 2020-06-04

## 2020-06-02 RX ORDER — LABETALOL HYDROCHLORIDE 5 MG/ML
80 INJECTION, SOLUTION INTRAVENOUS ONCE AS NEEDED
Status: DISCONTINUED | OUTPATIENT
Start: 2020-06-02 | End: 2020-06-04 | Stop reason: HOSPADM

## 2020-06-02 RX ORDER — MISOPROSTOL 200 UG/1
800 TABLET ORAL
Status: DISCONTINUED | OUTPATIENT
Start: 2020-06-02 | End: 2020-06-02

## 2020-06-02 RX ORDER — SODIUM CHLORIDE, SODIUM LACTATE, POTASSIUM CHLORIDE, CALCIUM CHLORIDE 600; 310; 30; 20 MG/100ML; MG/100ML; MG/100ML; MG/100ML
INJECTION, SOLUTION INTRAVENOUS CONTINUOUS
Status: DISCONTINUED | OUTPATIENT
Start: 2020-06-02 | End: 2020-06-04 | Stop reason: HOSPADM

## 2020-06-02 RX ORDER — FAMOTIDINE 10 MG/ML
20 INJECTION INTRAVENOUS
Status: DISCONTINUED | OUTPATIENT
Start: 2020-06-02 | End: 2020-06-02

## 2020-06-02 RX ORDER — METHYLERGONOVINE MALEATE 0.2 MG/ML
200 INJECTION INTRAVENOUS
Status: DISCONTINUED | OUTPATIENT
Start: 2020-06-02 | End: 2020-06-02

## 2020-06-02 RX ORDER — LABETALOL HYDROCHLORIDE 5 MG/ML
40 INJECTION, SOLUTION INTRAVENOUS ONCE AS NEEDED
Status: DISCONTINUED | OUTPATIENT
Start: 2020-06-02 | End: 2020-06-04 | Stop reason: HOSPADM

## 2020-06-02 RX ORDER — LABETALOL HYDROCHLORIDE 5 MG/ML
20 INJECTION, SOLUTION INTRAVENOUS ONCE AS NEEDED
Status: DISCONTINUED | OUTPATIENT
Start: 2020-06-02 | End: 2020-06-04 | Stop reason: HOSPADM

## 2020-06-02 RX ORDER — CALCIUM GLUCONATE 98 MG/ML
1 INJECTION, SOLUTION INTRAVENOUS
Status: DISCONTINUED | OUTPATIENT
Start: 2020-06-02 | End: 2020-06-04 | Stop reason: HOSPADM

## 2020-06-02 RX ORDER — CARBOPROST TROMETHAMINE 250 UG/ML
250 INJECTION, SOLUTION INTRAMUSCULAR
Status: DISCONTINUED | OUTPATIENT
Start: 2020-06-02 | End: 2020-06-02

## 2020-06-02 RX ADMIN — NIFEDIPINE 30 MG: 30 TABLET, FILM COATED, EXTENDED RELEASE ORAL at 09:06

## 2020-06-02 RX ADMIN — SODIUM CHLORIDE, SODIUM LACTATE, POTASSIUM CHLORIDE, AND CALCIUM CHLORIDE: .6; .31; .03; .02 INJECTION, SOLUTION INTRAVENOUS at 05:06

## 2020-06-02 RX ADMIN — BUTALBITAL, ACETAMINOPHEN AND CAFFEINE 1 TABLET: 50; 325; 40 TABLET ORAL at 09:06

## 2020-06-02 RX ADMIN — MAGNESIUM SULFATE HEPTAHYDRATE 4 G: 40 INJECTION, SOLUTION INTRAVENOUS at 05:06

## 2020-06-02 NOTE — LACTATION NOTE
Visited Miss Juarez at bedside. She is in the hospital for pre-eclampsia work up. She is overwhelmed and crying. Support given. She states that she needs to pump.  Jarvam Symphony breast pump set up at bedside.  Instructed on proper usage and to adjust suction according to comfort level. Verified appropriate flange fit- 24. Reviewed frequency and duration of pumping in order to promote and maintain full milk supply. Hands-on pumping technique reviewed. Mother is comfortable with the pump, as she is renting a Symphony for personal use.   Supplemental bottles brought to bedside, explained to mother process to refrigerate milk in Mother baby (label with date and time). Mother verbalized understanding.

## 2020-06-02 NOTE — SUBJECTIVE & OBJECTIVE
OB History    Para Term  AB Living   2 2 2 0 0 3   SAB TAB Ectopic Multiple Live Births   0 0 0 1 3      # Outcome Date GA Lbr Miguel/2nd Weight Sex Delivery Anes PTL Lv   2A Term 20 38w0d  3.47 kg (7 lb 10.4 oz) M CS-LTranv Spinal N NATALI      Name: ANGELLA SHELBY BOY CLAUDIOETRIA      Apgar1: 9  Apgar5: 9   2B Term 20 38w0d  2.76 kg (6 lb 1.4 oz) F CS-LTranv Spinal N NATALI      Name: TRINO SHELBY GIRL JAMETRIA      Apgar1: 1  Apgar5: 8   1 Term 16 38w6d  3.124 kg (6 lb 14.2 oz) M CS-LTranv EPI N NATALI      Complications: Fetal Intolerance      Apgar1: 4  Apgar5: 8     Past Medical History:   Diagnosis Date    Hand pain, right      Past Surgical History:   Procedure Laterality Date     SECTION       SECTION N/A 2020    Procedure:  SECTION;  Surgeon: Marylin Vora MD;  [facility]: [facility] L&D;  Service: OB/GYN;  Laterality: N/A;       PTA Medications   Medication Sig    ferrous sulfate (FEOSOL) 325 mg (65 mg iron) Tab tablet TK 1 T PO BID    HYDROcodone-acetaminophen (NORCO) 5-325 mg per tablet Take 1 tablet by mouth every 8 (eight) hours as needed for Pain (may alternate with ibuprofen).    ibuprofen (ADVIL,MOTRIN) 600 MG tablet Take 1 tablet (600 mg total) by mouth every 8 (eight) hours as needed for Pain (take with food, and may alternate with norco).    PNV,calcium 72-iron-folic acid (PRENATAL VITAMIN PLUS LOW IRON) 27 mg iron- 1 mg Tab Take 1 tablet (1 each total) by mouth once daily.    promethazine (PHENERGAN) 25 MG tablet Take 1 tablet (25 mg total) by mouth every 4 (four) hours. (Patient not taking: Reported on 2020)       Review of patient's allergies indicates:  No Known Allergies     Family History     None        Tobacco Use    Smoking status: Never Smoker    Smokeless tobacco: Never Used   Substance and Sexual Activity    Alcohol use: Not Currently     Alcohol/week: 0.0 standard drinks     Comment: occasional    Drug use: No    Sexual activity:  Yes     Partners: Male     Birth control/protection: None     Review of Systems   Cardiovascular: Positive for leg swelling.   Neurological: Positive for headaches.   All other systems reviewed and are negative.     Objective:     Vital Signs (Most Recent):  Temp: 98.3 °F (36.8 °C) (06/02/20 1555)  Pulse: 73 (06/02/20 1602)  Resp: 18 (06/02/20 1555)  BP: (!) 153/89 (06/02/20 1602)  SpO2: 100 % (06/02/20 1555) Vital Signs (24h Range):  Temp:  [98.3 °F (36.8 °C)] 98.3 °F (36.8 °C)  Pulse:  [72-77] 73  Resp:  [18] 18  SpO2:  [100 %] 100 %  BP: (100-153)/(60-98) 153/89        There is no height or weight on file to calculate BMI.    Patient's last menstrual period was 09/15/2019 (exact date).    Physical Exam:   Constitutional: She is oriented to person, place, and time. She appears well-developed and well-nourished.       Cardiovascular: Normal rate and regular rhythm.     Pulmonary/Chest: Effort normal and breath sounds normal.        Abdominal: Soft.             Musculoskeletal: Normal range of motion and moves all extremeties.       Neurological: She is alert and oriented to person, place, and time. She has normal reflexes.    Skin: Skin is warm and dry.    Psychiatric: She has a normal mood and affect. Her behavior is normal.       Laboratory:  Pending

## 2020-06-02 NOTE — PROGRESS NOTES
Subjective:       Patient ID: Roger Juarez is a 25 y.o. female.    Chief Complaint:  Wound Check      History of Present Illness  HPI  Postoperative Follow-up  Patient presents to the clinic POD5 status post  for repeat  and di di twins. At home noticed blood pressures of 180's/110's but wasn't sure about her home BP machine and it's accuracy.  Denies HA, changes to her vision, RUQ pain. Still has leg swelling and has to keep her legs elevated.      GYN & OB History  Patient's last menstrual period was 09/15/2019 (exact date).   Date of Last Pap: 2017    OB History    Para Term  AB Living   2 2 2     3   SAB TAB Ectopic Multiple Live Births         1 3      # Outcome Date GA Lbr Miguel/2nd Weight Sex Delivery Anes PTL Lv   2A Term 20 38w0d  3.47 kg (7 lb 10.4 oz) M CS-LTranv Spinal N NATALI   2B Term 20 38w0d  2.76 kg (6 lb 1.4 oz) F CS-LTranv Spinal N NATALI   1 Term 16 38w6d  3.124 kg (6 lb 14.2 oz) M CS-LTranv EPI N NATALI      Complications: Fetal Intolerance       Review of Systems  Review of Systems   Constitutional: Negative for activity change, chills, fatigue and fever.   Respiratory: Negative for cough.    Cardiovascular: Positive for leg swelling. Negative for chest pain.   Gastrointestinal: Negative for abdominal pain, constipation, nausea and vomiting.   Genitourinary: Negative for dysuria, frequency, hematuria, pelvic pain, urgency, vaginal bleeding and vaginal discharge.   Integumentary:  Negative for rash.           Objective:    Physical Exam:   Constitutional: She is oriented to person, place, and time. She appears well-developed and well-nourished. No distress.       Cardiovascular: Normal rate.     Pulmonary/Chest: Effort normal. No respiratory distress.        Abdominal: Soft. She exhibits abdominal incision (Aquacel dressing in place, clear/dry/intact). She exhibits no distension. There is no tenderness. There is no guarding.   Uterine  fundus firm, below umbilicus, mild tenderness (appropriate)             Musculoskeletal: Moves all extremeties. She exhibits edema (1+ BLE).   No calf tenderness       Neurological: She is alert and oriented to person, place, and time.   Reflex Scores:       Tricep reflexes are 1+ on the right side and 1+ on the left side.       Bicep reflexes are 1+ on the right side and 1+ on the left side.       Brachioradialis reflexes are 1+ on the right side and 1+ on the left side.       Patellar reflexes are 1+ on the right side and 1+ on the left side.  No clonus bilaterally    Skin: Skin is warm and dry. No rash noted. She is not diaphoretic.           Problem List Items Addressed This Visit        Cardiac/Vascular    Elevated blood pressure reading       Obstetric    S/P repeat low transverse  - Primary      Asymptomatic and reflexes are normal  BP today is 160/100. We used her home machine for a second measurement and it was quite elevated 189/127, do not suspect this is entirely accurate.   Will refer to L&D for preE work up.  Scheduled follow up in 2 days with me for blood pressure check

## 2020-06-02 NOTE — H&P
[facility]  Obstetrics & Gynecology  History & Physical    Patient Name: Roger Shelby  MRN: [id number]  Admission Date: 2020  Primary Care Provider: Primary Doctor No    Subjective:     Chief Complaint/Reason for Admission: Elevated Blood Pressure    History of Present Illness:  Sent from office for elevated blood pressure        OB History    Para Term  AB Living   2 2 2 0 0 3   SAB TAB Ectopic Multiple Live Births   0 0 0 1 3      # Outcome Date GA Lbr Miguel/2nd Weight Sex Delivery Anes PTL Lv   2A Term 20 38w0d  3.47 kg (7 lb 10.4 oz) M CS-LTranv Spinal N NATALI      Name: ANGELLA SHELBY BOY JAMETRIA      Apgar1: 9  Apgar5: 9   2B Term 20 38w0d  2.76 kg (6 lb 1.4 oz) F CS-LTranv Spinal N NATALI      Name: TRINO SHELBY GIRL CLAUDIOETRIA      Apgar1: 1  Apgar5: 8   1 Term 16 38w6d  3.124 kg (6 lb 14.2 oz) M CS-LTranv EPI N NATALI      Complications: Fetal Intolerance      Apgar1: 4  Apgar5: 8     Past Medical History:   Diagnosis Date    Hand pain, right      Past Surgical History:   Procedure Laterality Date     SECTION       SECTION N/A 2020    Procedure:  SECTION;  Surgeon: Marylin Vora MD;  Location: [facility] L&[facility];  Service: OB/GYN;  Laterality: N/A;       PTA Medications   Medication Sig    ferrous sulfate (FEOSOL) 325 mg (65 mg iron) Tab tablet TK 1 T PO BID    HYDROcodone-acetaminophen (NORCO) 5-325 mg per tablet Take 1 tablet by mouth every 8 (eight) hours as needed for Pain (may alternate with ibuprofen).    ibuprofen (ADVIL,MOTRIN) 600 MG tablet Take 1 tablet (600 mg total) by mouth every 8 (eight) hours as needed for Pain (take with food, and may alternate with norco).    PNV,calcium 72-iron-folic acid (PRENATAL VITAMIN PLUS LOW IRON) 27 mg iron- 1 mg Tab Take 1 tablet (1 each total) by mouth once daily.    promethazine (PHENERGAN) 25 MG tablet Take 1 tablet (25 mg total) by mouth every 4 (four) hours. (Patient not taking:  Reported on 6/2/2020)       Review of patient's allergies indicates:  No Known Allergies     Family History     None        Tobacco Use    Smoking status: Never Smoker    Smokeless tobacco: Never Used   Substance and Sexual Activity    Alcohol use: Not Currently     Alcohol/week: 0.0 standard drinks     Comment: occasional    Drug use: No    Sexual activity: Yes     Partners: Male     Birth control/protection: None     Review of Systems   Cardiovascular: Positive for leg swelling.   Neurological: Positive for headaches.   All other systems reviewed and are negative.     Objective:     Vital Signs (Most Recent):  Temp: 98.3 °F (36.8 °C) (06/02/20 1555)  Pulse: 73 (06/02/20 1602)  Resp: 18 (06/02/20 1555)  BP: (!) 153/89 (06/02/20 1602)  SpO2: 100 % (06/02/20 1555) Vital Signs (24h Range):  Temp:  [98.3 °F (36.8 °C)] 98.3 °F (36.8 °C)  Pulse:  [72-77] 73  Resp:  [18] 18  SpO2:  [100 %] 100 %  BP: (100-153)/(60-98) 153/89        There is no height or weight on file to calculate BMI.    Patient's last menstrual period was 09/15/2019 (exact date).    Physical Exam:   Constitutional: She is oriented to person, place, and time. She appears well-developed and well-nourished.       Cardiovascular: Normal rate and regular rhythm.     Pulmonary/Chest: Effort normal and breath sounds normal.        Abdominal: Soft.             Musculoskeletal: Normal range of motion and moves all extremeties.       Neurological: She is alert and oriented to person, place, and time. She has normal reflexes.    Skin: Skin is warm and dry.    Psychiatric: She has a normal mood and affect. Her behavior is normal.       Laboratory:  Pending        Assessment/Plan:     * Elevated blood pressure reading  BP series  Pre-E labs and PCR        Natty Talavera CNM  Obstetrics & Gynecology  [facility]

## 2020-06-02 NOTE — PROGRESS NOTES
BP readings and labs reviewed with Dr Rivera.  Will initiate Labetalol IV protocol and Magnesium Sulfate.  POC reviewed with patient.

## 2020-06-02 NOTE — HOSPITAL COURSE
BP series  Pre-E labs and PCR  Patient readmitted to L&D for postpartum severe preeclampsia.  She was started on procardia 30mgXL daily and IV magnesium sulfate seizure prophylaxis x 24 hours.  6/4/20: Patient feels well off magnesium.  BP moderately elevated.  Procardia increased to 60mgXL daily.

## 2020-06-02 NOTE — PROGRESS NOTES
S: voices no complaints @ present  O:  VS reviewed, afebrile   Vitals:    20 1633 20 1636 20 1638 20 1648   BP: (!) 165/96  (!) 164/97 (!) 163/94   Pulse: 73 72 73 67   Resp:    20   Temp:       TempSrc:       SpO2:  100%         FHTs:  repetative late decelerations noted  UC q 3-4, pitocin off  SVE deferred    A: IUP @ 38w0d ;     Patient Active Problem List   Diagnosis    Previous  section    Dichorionic diamniotic twin pregnancy, antepartum    Dichorionic diamniotic twin pregnancy in third trimester    SPROM (prolonged spontaneous rupture of membranes)    S/P repeat low transverse     Elevated blood pressure reading       P: Dr Rivera notified of decelerations. Will proceed with .  POC discussed with patient and her mother and they agree.    Continue close monitoring of maternal/fetal status

## 2020-06-03 PROBLEM — O30.049 DICHORIONIC DIAMNIOTIC TWIN PREGNANCY, ANTEPARTUM: Status: RESOLVED | Noted: 2020-03-05 | Resolved: 2020-06-03

## 2020-06-03 PROBLEM — O42.90 SPROM (PROLONGED SPONTANEOUS RUPTURE OF MEMBRANES): Status: RESOLVED | Noted: 2020-05-28 | Resolved: 2020-06-03

## 2020-06-03 LAB — SARS-COV-2 RDRP RESP QL NAA+PROBE: NEGATIVE

## 2020-06-03 PROCEDURE — 63600175 PHARM REV CODE 636 W HCPCS: Performed by: ADVANCED PRACTICE MIDWIFE

## 2020-06-03 PROCEDURE — 96366 THER/PROPH/DIAG IV INF ADDON: CPT

## 2020-06-03 PROCEDURE — 99225 PR SUBSEQUENT OBSERVATION CARE,LEVEL II: ICD-10-PCS | Mod: ,,, | Performed by: OBSTETRICS & GYNECOLOGY

## 2020-06-03 PROCEDURE — 96365 THER/PROPH/DIAG IV INF INIT: CPT

## 2020-06-03 PROCEDURE — 25000003 PHARM REV CODE 250: Performed by: ADVANCED PRACTICE MIDWIFE

## 2020-06-03 PROCEDURE — U0002 COVID-19 LAB TEST NON-CDC: HCPCS

## 2020-06-03 PROCEDURE — 99225 PR SUBSEQUENT OBSERVATION CARE,LEVEL II: CPT | Mod: ,,, | Performed by: OBSTETRICS & GYNECOLOGY

## 2020-06-03 RX ORDER — HYDROCODONE BITARTRATE AND ACETAMINOPHEN 5; 325 MG/1; MG/1
1 TABLET ORAL EVERY 4 HOURS PRN
Status: DISCONTINUED | OUTPATIENT
Start: 2020-06-03 | End: 2020-06-04 | Stop reason: HOSPADM

## 2020-06-03 RX ORDER — IBUPROFEN 600 MG/1
600 TABLET ORAL EVERY 6 HOURS
Status: DISCONTINUED | OUTPATIENT
Start: 2020-06-03 | End: 2020-06-04 | Stop reason: HOSPADM

## 2020-06-03 RX ADMIN — IBUPROFEN 600 MG: 600 TABLET, FILM COATED ORAL at 12:06

## 2020-06-03 RX ADMIN — SODIUM CHLORIDE, SODIUM LACTATE, POTASSIUM CHLORIDE, AND CALCIUM CHLORIDE: .6; .31; .03; .02 INJECTION, SOLUTION INTRAVENOUS at 01:06

## 2020-06-03 RX ADMIN — MAGNESIUM SULFATE IN WATER 2 G/HR: 40 INJECTION, SOLUTION INTRAVENOUS at 10:06

## 2020-06-03 RX ADMIN — NIFEDIPINE 30 MG: 30 TABLET, FILM COATED, EXTENDED RELEASE ORAL at 09:06

## 2020-06-03 RX ADMIN — BUTALBITAL, ACETAMINOPHEN AND CAFFEINE 1 TABLET: 50; 325; 40 TABLET ORAL at 03:06

## 2020-06-03 RX ADMIN — IBUPROFEN 600 MG: 600 TABLET, FILM COATED ORAL at 06:06

## 2020-06-03 NOTE — ASSESSMENT & PLAN NOTE
HD # 2  Pt doing well.  BP remain mildly elevated on Procardia but stable.  Plan to complete Mg today and observe BP.  Will consider discharge tomorrow AM if BP remains well controlled.

## 2020-06-03 NOTE — SUBJECTIVE & OBJECTIVE
Interval History:   Pt doing well.  Reports no issues this AM.  Mild headache is resolving.  Denies vision changes.    Scheduled Meds:   ibuprofen  600 mg Oral Q6H    NIFEdipine  30 mg Oral Daily     Continuous Infusions:   lactated ringers 75 mL/hr at 06/02/20 1734    magnesium sulfate in water 2 g/hr (06/02/20 1800)     PRN Meds:butalbital-acetaminophen-caffeine -40 mg, calcium gluconate, hydrALAZINE, HYDROcodone-acetaminophen, labetalol, labetalol, labetalol    Review of patient's allergies indicates:  No Known Allergies    Objective:     Vital Signs (Most Recent):  Temp: 98 °F (36.7 °C) (06/03/20 0800)  Pulse: 90 (06/03/20 0800)  Resp: 20 (06/03/20 0800)  BP: (!) 148/84 (06/03/20 0907)  SpO2: 100 % (06/03/20 0800) Vital Signs (24h Range):  Temp:  [98 °F (36.7 °C)-98.3 °F (36.8 °C)] 98 °F (36.7 °C)  Pulse:  [67-97] 90  Resp:  [18-20] 20  SpO2:  [98 %-100 %] 100 %  BP: (100-165)/(60-99) 148/84        There is no height or weight on file to calculate BMI.  Patient's last menstrual period was 09/15/2019 (exact date).    I&O (Last 24H):    Intake/Output Summary (Last 24 hours) at 6/3/2020 0909  Last data filed at 6/3/2020 0800  Gross per 24 hour   Intake 250 ml   Output 4100 ml   Net -3850 ml       Physical Exam:   Constitutional: She is oriented to person, place, and time. She appears well-developed and well-nourished. No distress.       Cardiovascular: Normal rate, regular rhythm and normal heart sounds.     Pulmonary/Chest: Effort normal and breath sounds normal.        Abdominal: Soft. Bowel sounds are normal. She exhibits abdominal incision (dressing in place). She exhibits no distension. There is no tenderness.             Musculoskeletal: Normal range of motion and moves all extremeties. She exhibits no edema or tenderness.       Neurological: She is alert and oriented to person, place, and time.    Skin: Skin is warm and dry.    Psychiatric: She has a normal mood and affect. Her behavior is normal.  Thought content normal.       Laboratory:  I have personallly reviewed all pertinent lab results from the last 24 hours.    Diagnostic Results:  Labs: Reviewed

## 2020-06-03 NOTE — LACTATION NOTE
"Lactation Rounds: mother states that she has been pumping "about every 3 hours" with hands on pumping techniques for 30 minutes each pumping session and is collecting 30-40 mL each pumping session. Reinforced the need to use the Maintain setting on Medela Symphony. Reviewed proper use of pump, cleaning of kit & proper handling, collection and storage of EBM. Encouraged mother to pump every 2-3 hours, as condition allows. Once mother is more stable, will address low milk supply. Mother states that she has not yet received her pump from insurance and she is using her rental Medela Symphony. Mother denies any current lactation needs or concerns at this time and will call for assistance as needed/desired.  "

## 2020-06-04 VITALS
OXYGEN SATURATION: 100 % | RESPIRATION RATE: 20 BRPM | TEMPERATURE: 98 F | SYSTOLIC BLOOD PRESSURE: 142 MMHG | HEART RATE: 78 BPM | DIASTOLIC BLOOD PRESSURE: 94 MMHG

## 2020-06-04 DIAGNOSIS — H04.9: Primary | ICD-10-CM

## 2020-06-04 PROCEDURE — 99225 PR SUBSEQUENT OBSERVATION CARE,LEVEL II: CPT | Mod: ,,, | Performed by: OBSTETRICS & GYNECOLOGY

## 2020-06-04 PROCEDURE — 25000003 PHARM REV CODE 250: Performed by: ADVANCED PRACTICE MIDWIFE

## 2020-06-04 PROCEDURE — 99225 PR SUBSEQUENT OBSERVATION CARE,LEVEL II: ICD-10-PCS | Mod: ,,, | Performed by: OBSTETRICS & GYNECOLOGY

## 2020-06-04 PROCEDURE — 25000003 PHARM REV CODE 250: Performed by: OBSTETRICS & GYNECOLOGY

## 2020-06-04 RX ORDER — NIFEDIPINE 30 MG/1
60 TABLET, EXTENDED RELEASE ORAL DAILY
Status: DISCONTINUED | OUTPATIENT
Start: 2020-06-04 | End: 2020-06-04 | Stop reason: HOSPADM

## 2020-06-04 RX ORDER — NIFEDIPINE 60 MG/1
60 TABLET, EXTENDED RELEASE ORAL DAILY
Qty: 30 TABLET | Refills: 1 | Status: SHIPPED | OUTPATIENT
Start: 2020-06-05 | End: 2023-06-16

## 2020-06-04 RX ADMIN — IBUPROFEN 600 MG: 600 TABLET, FILM COATED ORAL at 06:06

## 2020-06-04 RX ADMIN — NIFEDIPINE 60 MG: 30 TABLET, FILM COATED, EXTENDED RELEASE ORAL at 09:06

## 2020-06-04 RX ADMIN — IBUPROFEN 600 MG: 600 TABLET, FILM COATED ORAL at 12:06

## 2020-06-04 NOTE — DISCHARGE SUMMARY
[facility]   Obstetrics & Gynecology  Discharge Summary    Patient Name: Roger Juarez  MRN: [id number]  Admission Date: 2020  Hospital Length of Stay: 0 days  Discharge Date and Time:  2020 3:01 PM  Attending Physician: Desire Rivera MD   Discharging Provider: Irasema Petersen PA-C  Primary Care Provider: Primary Doctor Vale    HPI:  Sent from office for elevated blood pressure    Hospital Course:  BP series  Pre-E labs and PCR  Patient readmitted to L&D for postpartum severe preeclampsia.  She was started on procardia 30mgXL daily and IV magnesium sulfate seizure prophylaxis x 24 hours.  20: Patient feels well off magnesium.  BP moderately elevated.  Procardia increased to 60mgXL daily.    * No surgery found *         Significant Diagnostic Studies: Labs: All labs within the past 24 hours have been reviewed      Pending Diagnostic Studies:     None        Final Active Diagnoses:    Diagnosis Date Noted POA    PRINCIPAL PROBLEM:  Pre-eclampsia, postpartum [O14.95] 2020 Yes    S/P repeat low transverse  [Z98.891] 2020 Not Applicable      Problems Resolved During this Admission:        Discharged Condition: good    Disposition:     Follow Up:    Patient Instructions:      Diet Adult Regular     Other restrictions (specify):   Order Comments: Pelvic rest x 6 weeks (no tampons, intercourse douching); showers only for 6 wks; no lifting more than baby     Call MD for:  temperature >100.4     Call MD for:  severe uncontrolled pain     Call MD for:  difficulty breathing or increased cough     Call MD for:  severe persistent headache     Call MD for:  persistent dizziness, light-headedness, or visual disturbances     Call MD for:  redness, tenderness, or signs of infection (pain, swelling, redness, odor or green/yellow discharge around incision site)     Medications:  Reconciled Home Medications:      Medication List      START taking these medications     NIFEdipine 60 MG (OSM) 24 hr tablet  Commonly known as:  PROCARDIA-XL  Take 1 tablet (60 mg total) by mouth once daily.  Start taking on:  June 5, 2020        CONTINUE taking these medications    ferrous sulfate 325 mg (65 mg iron) Tab tablet  Commonly known as:  FEOSOL  TK 1 T PO BID     HYDROcodone-acetaminophen 5-325 mg per tablet  Commonly known as:  NORCO  Take 1 tablet by mouth every 8 (eight) hours as needed for Pain (may alternate with ibuprofen).     ibuprofen 600 MG tablet  Commonly known as:  ADVIL,MOTRIN  Take 1 tablet (600 mg total) by mouth every 8 (eight) hours as needed for Pain (take with food, and may alternate with norco).     PNV,calcium 72-iron-folic acid 27 mg iron- 1 mg Tab  Commonly known as:  PRENATAL VITAMIN PLUS LOW IRON  Take 1 tablet (1 each total) by mouth once daily.     promethazine 25 MG tablet  Commonly known as:  PHENERGAN  Take 1 tablet (25 mg total) by mouth every 4 (four) hours.            Irasema Petersen PA-C  Obstetrics & Gynecology  [facility]

## 2020-06-04 NOTE — LACTATION NOTE
Called into patients room. Mother reports pumping is going well. She is using Medela rental pump until her personal pump comes in. Mother reports she is pumping 8 or more times using hands on pumping and hand expressing afterward. Mother denies any lactation assistance, questions, or concerns at this time. Instructed to call lactation department as needed. Mother verbalized understanding.

## 2020-06-04 NOTE — ASSESSMENT & PLAN NOTE
HD # 3  Patient is doing well, and asymptomatic off magnesium sulfate.  BP moderately elevated on procardia 30mg.  Increase to 60mg.  Possible discharge to home this afternoon.

## 2020-06-04 NOTE — SUBJECTIVE & OBJECTIVE
Interval History: Patient feels well this morning.  Denies any headache, change in vision, CP, SOB, RUQ, or epigastric pain.  Is hoping for discharge to home.    Scheduled Meds:   ibuprofen  600 mg Oral Q6H    NIFEdipine  60 mg Oral Daily     Continuous Infusions:   lactated ringers Stopped (06/03/20 1800)    magnesium sulfate in water Stopped (06/03/20 1800)     PRN Meds:butalbital-acetaminophen-caffeine -40 mg, calcium gluconate, hydrALAZINE, HYDROcodone-acetaminophen, labetalol, labetalol, labetalol    Review of patient's allergies indicates:  No Known Allergies    Objective:     Vital Signs (Most Recent):  Temp: 98.8 °F (37.1 °C) (06/04/20 0726)  Pulse: 81 (06/04/20 0726)  Resp: 20 (06/04/20 0726)  BP: (!) 145/88 (06/04/20 0919)  SpO2: 100 % (06/04/20 0725) Vital Signs (24h Range):  Temp:  [98.4 °F (36.9 °C)-98.8 °F (37.1 °C)] 98.8 °F (37.1 °C)  Pulse:  [] 81  Resp:  [18-20] 20  SpO2:  [100 %] 100 %  BP: (107-152)/(86-97) 145/88        There is no height or weight on file to calculate BMI.  Patient's last menstrual period was 09/15/2019 (exact date).    I&O (Last 24H):    Intake/Output Summary (Last 24 hours) at 6/4/2020 1158  Last data filed at 6/4/2020 0015  Gross per 24 hour   Intake 2235 ml   Output 4350 ml   Net -2115 ml       Physical Exam:   Constitutional: She is oriented to person, place, and time. She appears well-developed and well-nourished. No distress.    HENT:   Head: Normocephalic and atraumatic.     Neck: Neck supple. No thyromegaly present.    Cardiovascular: Normal rate and regular rhythm.     Pulmonary/Chest: Effort normal. No respiratory distress. She has no wheezes. She has no rales.        Abdominal: Soft. She exhibits abdominal incision (Aquasel dressing clean, dry, and intact). She exhibits no distension and no mass. There is no tenderness. There is no rebound and no guarding.   Fundus firm, below the umbilicus             Musculoskeletal: She exhibits no edema.        Neurological: She is alert and oriented to person, place, and time. She displays normal reflexes (DTR's 2+ bilaterally).    Skin: No rash noted.    Psychiatric: She has a normal mood and affect. Her behavior is normal. Judgment and thought content normal.       Laboratory:  Recent Lab Results     None          Diagnostic Results:  Labs: Reviewed

## 2020-06-04 NOTE — PROGRESS NOTES
[facility]   Obstetrics & Gynecology  Progress Note    Patient Name: Roger Juarez  MRN: [id number]  Admission Date: 6/2/2020  Primary Care Provider: Primary Doctor No  Principal Problem: Pre-eclampsia, postpartum    Subjective:     HPI:  Sent from office for elevated blood pressure    Interval History: Patient feels well this morning.  Denies any headache, change in vision, CP, SOB, RUQ, or epigastric pain.  Is hoping for discharge to home.    Scheduled Meds:   ibuprofen  600 mg Oral Q6H    NIFEdipine  60 mg Oral Daily     Continuous Infusions:   lactated ringers Stopped (06/03/20 1800)    magnesium sulfate in water Stopped (06/03/20 1800)     PRN Meds:butalbital-acetaminophen-caffeine -40 mg, calcium gluconate, hydrALAZINE, HYDROcodone-acetaminophen, labetalol, labetalol, labetalol    Review of patient's allergies indicates:  No Known Allergies    Objective:     Vital Signs (Most Recent):  Temp: 98.8 °F (37.1 °C) (06/04/20 0726)  Pulse: 81 (06/04/20 0726)  Resp: 20 (06/04/20 0726)  BP: (!) 145/88 (06/04/20 0919)  SpO2: 100 % (06/04/20 0725) Vital Signs (24h Range):  Temp:  [98.4 °F (36.9 °C)-98.8 °F (37.1 °C)] 98.8 °F (37.1 °C)  Pulse:  [] 81  Resp:  [18-20] 20  SpO2:  [100 %] 100 %  BP: (107-152)/(86-97) 145/88        There is no height or weight on file to calculate BMI.  Patient's last menstrual period was 09/15/2019 (exact date).    I&O (Last 24H):    Intake/Output Summary (Last 24 hours) at 6/4/2020 1158  Last data filed at 6/4/2020 0015  Gross per 24 hour   Intake 2235 ml   Output 4350 ml   Net -2115 ml       Physical Exam:   Constitutional: She is oriented to person, place, and time. She appears well-developed and well-nourished. No distress.    HENT:   Head: Normocephalic and atraumatic.     Neck: Neck supple. No thyromegaly present.    Cardiovascular: Normal rate and regular rhythm.     Pulmonary/Chest: Effort normal. No respiratory distress. She has no wheezes. She  has no rales.        Abdominal: Soft. She exhibits abdominal incision (Aquasel dressing clean, dry, and intact). She exhibits no distension and no mass. There is no tenderness. There is no rebound and no guarding.   Fundus firm, below the umbilicus             Musculoskeletal: She exhibits no edema.       Neurological: She is alert and oriented to person, place, and time. She displays normal reflexes (DTR's 2+ bilaterally).    Skin: No rash noted.    Psychiatric: She has a normal mood and affect. Her behavior is normal. Judgment and thought content normal.       Laboratory:  Recent Lab Results     None          Diagnostic Results:  Labs: Reviewed    Assessment/Plan:     * Pre-eclampsia, postpartum  HD # 3  Patient is doing well, and asymptomatic off magnesium sulfate.  BP moderately elevated on procardia 30mg.  Increase to 60mg.  Possible discharge to home this afternoon.    S/P repeat low transverse   Remove Aquasel dressing today.        Rossy Carver MD  Obstetrics & Gynecology  [facility]

## 2020-06-08 ENCOUNTER — POSTPARTUM VISIT (OUTPATIENT)
Dept: OBSTETRICS AND GYNECOLOGY | Facility: CLINIC | Age: 25
End: 2020-06-08
Payer: MEDICAID

## 2020-06-08 VITALS
BODY MASS INDEX: 39.49 KG/M2 | SYSTOLIC BLOOD PRESSURE: 128 MMHG | DIASTOLIC BLOOD PRESSURE: 90 MMHG | HEIGHT: 65 IN | WEIGHT: 237 LBS

## 2020-06-08 PROCEDURE — 99999 PR PBB SHADOW E&M-EST. PATIENT-LVL III: CPT | Mod: PBBFAC,,, | Performed by: MIDWIFE

## 2020-06-08 PROCEDURE — 99999 PR PBB SHADOW E&M-EST. PATIENT-LVL III: ICD-10-PCS | Mod: PBBFAC,,, | Performed by: MIDWIFE

## 2020-06-08 PROCEDURE — 59430 PR CARE AFTER DELIVERY ONLY: ICD-10-PCS | Mod: ,,, | Performed by: MIDWIFE

## 2020-06-08 PROCEDURE — 99213 OFFICE O/P EST LOW 20 MIN: CPT | Mod: PBBFAC | Performed by: MIDWIFE

## 2020-06-08 RX ORDER — TERCONAZOLE 4 MG/G
CREAM VAGINAL
COMMUNITY
Start: 2020-03-12 | End: 2023-06-16

## 2020-06-08 NOTE — PROGRESS NOTES
Here for f/u after being discharged from hospital for postpartum pre-eclampsia  BP today 128/90  Weight 107.5 kg  Denies headache, vision changes, RUQ/epigastric pain  Reports intermittent mild swelling of ankles, none at present  Is taking Procardia 60 mg daily as prescribed  Is breast and formula feeding  Breathing regular, unlabored  LE reflexes +2, no clonus, no LE edema  C/s incision is clean, dry, and intact  Pre-eclampsia precautions reviewed  RTC in 1 week for BP check, or PRN

## 2020-06-10 ENCOUNTER — NURSE TRIAGE (OUTPATIENT)
Dept: ADMINISTRATIVE | Facility: CLINIC | Age: 25
End: 2020-06-10

## 2020-06-10 NOTE — TELEPHONE ENCOUNTER
Called for post procedural symptom tracker. No answer and no need to call again today. Triage nurse will reach out tomorrow.    Reason for Disposition   No answer.  First attempt to contact caller.  Follow-up call scheduled within 15 minutes.    Additional Information   Negative: Caller has already spoken with the PCP (or office), and has no further questions   Negative: Caller has already spoken with another triager and has no further questions   Negative: Caller has already spoken with another triager or PCP (or office), and has further questions and triager able to answer questions.   Negative: Busy signal.  First attempt to contact caller.  Follow-up call scheduled within 15 minutes.    Protocols used: NO CONTACT OR DUPLICATE CONTACT CALL-A-[place]

## 2020-06-11 NOTE — TELEPHONE ENCOUNTER
Day 14 post procedural tracker program call. Made two unsuccessful attempts to reach patient today. No additional follow up with this program required at this time.

## 2020-06-15 ENCOUNTER — CLINICAL SUPPORT (OUTPATIENT)
Dept: OBSTETRICS AND GYNECOLOGY | Facility: CLINIC | Age: 25
End: 2020-06-15
Payer: MEDICAID

## 2020-06-15 VITALS — DIASTOLIC BLOOD PRESSURE: 82 MMHG | SYSTOLIC BLOOD PRESSURE: 126 MMHG

## 2020-06-15 PROCEDURE — 99211 OFF/OP EST MAY X REQ PHY/QHP: CPT | Mod: PBBFAC

## 2020-06-15 PROCEDURE — 99999 PR PBB SHADOW E&M-EST. PATIENT-LVL I: CPT | Mod: PBBFAC,,,

## 2020-06-15 PROCEDURE — 99999 PR PBB SHADOW E&M-EST. PATIENT-LVL I: ICD-10-PCS | Mod: PBBFAC,,,

## 2020-06-15 NOTE — PROGRESS NOTES
Verified patient using 2 patient identifiers. Patient in office today for blood pressure check. Patient states occasional headaches from time to time that resolve with medication. No other symptoms reported. Blood pressure in office today was 126/82. Notified YAEL Trejo CNM. Will schedule appointment for patient to return with PP visit.

## 2020-06-25 ENCOUNTER — POSTPARTUM VISIT (OUTPATIENT)
Dept: OBSTETRICS AND GYNECOLOGY | Facility: CLINIC | Age: 25
End: 2020-06-25
Payer: MEDICAID

## 2020-06-25 VITALS
HEIGHT: 65 IN | BODY MASS INDEX: 39.82 KG/M2 | SYSTOLIC BLOOD PRESSURE: 118 MMHG | DIASTOLIC BLOOD PRESSURE: 84 MMHG | WEIGHT: 239 LBS

## 2020-06-25 DIAGNOSIS — Z30.9 ENCOUNTER FOR CONTRACEPTIVE MANAGEMENT, UNSPECIFIED TYPE: ICD-10-CM

## 2020-06-25 DIAGNOSIS — Z12.4 SCREENING FOR CERVICAL CANCER: ICD-10-CM

## 2020-06-25 PROCEDURE — 11981 INSERTION DRUG DLVR IMPLANT: CPT | Mod: S$PBB,,, | Performed by: OBSTETRICS & GYNECOLOGY

## 2020-06-25 PROCEDURE — 99213 OFFICE O/P EST LOW 20 MIN: CPT | Mod: PBBFAC | Performed by: OBSTETRICS & GYNECOLOGY

## 2020-06-25 PROCEDURE — 11981 INSERTION DRUG DLVR IMPLANT: CPT | Mod: PBBFAC | Performed by: OBSTETRICS & GYNECOLOGY

## 2020-06-25 PROCEDURE — 88175 CYTOPATH C/V AUTO FLUID REDO: CPT

## 2020-06-25 PROCEDURE — 11981 PR INSERT, DRUG DELIVERY IMPLANT, BIORESORB/BIODEGR/NON-BIODEGR: ICD-10-PCS | Mod: S$PBB,,, | Performed by: OBSTETRICS & GYNECOLOGY

## 2020-06-25 RX ORDER — IBUPROFEN 600 MG/1
TABLET ORAL
COMMUNITY
Start: 2020-06-15 | End: 2022-11-23 | Stop reason: CLARIF

## 2020-06-25 NOTE — PROGRESS NOTES
NEXPLANON INSERTION:      PRE-NEXPLANON INSERTION COUNSELING:  All contraceptive options were reviewed and the patient chooses nexplanon.  Patients history was reviewed and there were no contraindications to nexplanon.  The procedure and minimal risks of pain, bleeding, bruising and infection at the insertion site discussed. Possible irregular menstrual bleeding pattern versus amenorrhea was explained.  No protection against STDs discussed.  Written information provided; all questions answered and patient agrees to proceed.  Consent signed and scanned into computer.    UPT neg    EXAM:  With patient in supine position the nondominant arm was flexed at the elbow and externally rotated.  The insertion site was identified about 8 cm above the elbow crease at the inner side of the upper arm overlying the groove between biceps and triceps.      PROCEDURE:  TIME OUT PERFORMED.  The insertion site was prepped with antiseptic and injected with 1cc of 1% Xylocaine without epinephrine subq along the planned insertion canal.  Xylocaine subq along the planned insertion canal.  Using sterile technique the Implanon applicator was visually verified and removed from the blister pack.  The base of the applicator was gently tapped with the needle pointed up until the implant disappeared back into the needle and the needle cap was removed.  The needle tip was inserted bevel side up at a 20 degree angle to penetrate the skin.  The applicator was lowered parallel to the arm and the skin was tented with the needle.  The seal of the applicator was broken by pressing the obturator support and turned 90degrees.  The obturator tip was fixed in place on the arm with one hand and with the other hand the needle was slowly and fully retracted back along full length of the obturator.  The grooved tip of the obturator was visible inside the needle and the implant waspalpable after insertion.  A small adhesive bandage and then a pressure bandage  was placed over the insertion site.  The patient tolerated the procedure well.  Nexplanon was placed in left arm.    ASSESSMENT:  1. Contraception management / nexplanon insertion.V25.0.    POST NEXPLANON INSERTION COUNSELING:  Manage post nexplanon placement arm pain with NSAIDs, Tylenol or Rx per [employer].  Keep arm elevated and apply intermittent ice packs to decrease pain and bruising for 24 Hours.  May remove bandage in 24 hours.  Implanon danger signs (worsening pain at insertion site, bleeding through bandage, redness and/or pus drainage at insertion site).  Removal in 3 years.    Counseling lasted approximately 15 minutes and all her questions were answered.     LOT [id number]     [id number]  Exp                 CC: Post-partum follow-up    Roger Juarez is a 25 y.o. female   who presents for post-partum visit.    She and the baby are doing well.  No pain.  No fever.   No bowel / bladder complaints.    Delivery note reviewed     2020  Pre-operative Diagnosis:   1. Previous   2. Dichorionic/diamniotic twin gestation 38w0d boy girl  3. Spontaneous rupture of membranes  Post-operative Diagnosis: same   PROCEDURE:   repeat low transverse  section   IV Fluids: 800mL  Estimated Blood Loss: 300mL     Breast feeding: yes     Birth control: She is not yet sexually active.  Contraception:  nexplanon today  Depression: no     Good family support: yes     Prenatal Record:  Gestational diabetes: none    Recent Hct:   Lab Results   Component Value Date    WBC 5.13 2020    HGB 10.5 (L) 2020    HCT 34.3 (L) 2020    MCV 83 2020     2020         Pap: normal within 3 years    GYN screening history: last Pap: was normal.      Health Maintenance   Topic Date Due    Lipid Panel  1993    Pap Smear  2020    TETANUS VACCINE  2030       HISTORY  Patient Active Problem List   Diagnosis    Dichorionic diamniotic twin pregnancy in third  trimester    S/P repeat low transverse     Pre-eclampsia, postpartum       Past Medical History:   Diagnosis Date    Hand pain, right        Past Surgical History:   Procedure Laterality Date     SECTION       SECTION N/A 2020    Procedure:  SECTION;  Surgeon: Marylin Vora MD;  Location: [facility] L&[facility];  Service: OB/GYN;  Laterality: N/A;       Family History   Problem Relation Age of Onset    Breast cancer Neg Hx     Colon cancer Neg Hx     Ovarian cancer Neg Hx        Social History     Socioeconomic History    Marital status: Single     Spouse name: Not on file    Number of children: Not on file    Years of education: Not on file    Highest education level: Not on file   Occupational History    Not on file   Social Needs    Financial resource strain: Not on file    Food insecurity     Worry: Not on file     Inability: Not on file    Transportation needs     Medical: Not on file     Non-medical: Not on file   Tobacco Use    Smoking status: Never Smoker    Smokeless tobacco: Never Used   Substance and Sexual Activity    Alcohol use: Not Currently     Alcohol/week: 0.0 standard drinks     Comment: occasional    Drug use: No    Sexual activity: Not Currently     Partners: Male     Birth control/protection: None   Lifestyle    Physical activity     Days per week: Not on file     Minutes per session: Not on file    Stress: Not on file   Relationships    Social connections     Talks on phone: Not on file     Gets together: Not on file     Attends [employer] service: Not on file     Active member of club or organization: Not on file     Attends meetings of clubs or organizations: Not on file     Relationship status: Not on file   Other Topics Concern    Not on file   Social History Narrative    Not on file       Current Outpatient Medications   Medication Sig Dispense Refill    ibuprofen (ADVIL,MOTRIN) 600 MG tablet Take 1 tablet (600 mg total) by mouth  every 8 (eight) hours as needed for Pain (take with food, and may alternate with norco). 30 tablet 1    NIFEdipine (PROCARDIA-XL) 60 MG (OSM) 24 hr tablet Take 1 tablet (60 mg total) by mouth once daily. 30 tablet 1    ferrous sulfate (FEOSOL) 325 mg (65 mg iron) Tab tablet TK 1 T PO BID      HYDROcodone-acetaminophen (NORCO) 5-325 mg per tablet Take 1 tablet by mouth every 8 (eight) hours as needed for Pain (may alternate with ibuprofen). (Patient not taking: Reported on 6/25/2020) 10 tablet 0    ibuprofen (ADVIL,MOTRIN) 600 MG tablet       PNV,calcium 72-iron-folic acid (PRENATAL VITAMIN PLUS LOW IRON) 27 mg iron- 1 mg Tab Take 1 tablet (1 each total) by mouth once daily. (Patient not taking: Reported on 6/15/2020) 30 tablet 11    promethazine (PHENERGAN) 25 MG tablet Take 1 tablet (25 mg total) by mouth every 4 (four) hours. (Patient not taking: Reported on 6/2/2020) 20 tablet 1    terconazole (TERAZOL 7) 0.4 % Crea        No current facility-administered medications for this visit.        Review of patient's allergies indicates:  No Known Allergies        PHYSICAL EXAM     Vitals:    06/25/20 1411   BP: 118/84       PAIN SCALE: 0/10 None    ROS:  GENERAL: No fever, chills, fatigability or weight loss.  ABDOMEN: Appetite fine. No weight loss. Denies diarrhea, abdominal pain, hematemesis or blood in stool.  No change in bowel movement pattern.  URINARY: No flank pain, dysuria or hematuria.  REPRODUCTIVE: No abnormal vaginal bleeding.  BREASTS: Breasts symmetric, nontender and no lumps detected.    PE:   APPEARANCE: Well nourished, well developed, in no acute distress.  NECK: Neck symmetric without masses or thyromegaly.  NODES: No inguinal lymph node enlargement.  ABDOMEN: Soft. No tenderness or masses. No hepatosplenomegaly. No hernias.  Inc CDI   BREASTS: Symmetrical, no skin changes or visible lesions. No palpable masses, nipple discharge or adenopathy bilaterally.    PELVIC:   VULVA: No lesions. Normal  female genitalia. Left buttock near vulva there are 5 sessile skin colored skin tags about 3mm each. No change in them per pt over the years.   URETHRAL MEATUS: Normal size and location, no lesions, no prolapse.  URETHRA: No masses, tenderness, prolapse or scarring.  VAGINA: Moist and well rugated, no discharge, no significant cystocele or rectocele.  CERVIX: No lesions, normal diameter, no stenosis, no cervical motion tenderness.  UTERUS: 8-10 week size, regular shape, mobile, non-tender, normal position, good support.  ADNEXA: No masses, tenderness or CDS nodularity.  ANUS PERINEUM: No lesions, no relaxation, no external hemorrhoids.  RECTUM: no masses      DIAGNOSIS:        1. Postpartum exam    2. Screening for cervical cancer    3. Encounter for contraceptive management, unspecified type        PLAN:   Orders Placed This Encounter   Procedures    Endoscopic injection/implant        MEDICATIONS PRESCRIBED:   PNV             COUNSELING:  Patient was counseled today on A.C.S. Pap guidelines and recommendations for yearly pelvic exams, mammograms and monthly self breast exams; to see her PCP for other health maintenance.     Reviewed all forms of contraception with patient including pills, patch, ring, Depo Provera, Nexplanon, Paraguard, and Mirena. The use of contraceptive options has been fully discussed with the patient. This includes the proper method to initiate and continue the pills, the need for regular compliance to ensure adequate contraceptive effect, the physiology which make the contraception effective, the instructions for what to do in event of a missed pill/ring/strings, and warnings about anticipated minor side effects such as breakthrough spotting, nausea, breast tenderness, weight changes, acne, headaches, etc.  She has been told of the more serious potential side effects such as MI, stroke, and deep vein thrombosis.  She has been asked to report any signs of such serious problems immediately.   She should back with a condom during the first month of use of any new contraception method. The need for additional protection, such as a condom, to prevent exposure to sexually transmitted diseases has also been discussed. She understands and wishes to take the congtraception as prescribed.      FOLLOW-UP: With me in 1 year. Pap smear every 3 years.

## 2020-07-01 LAB
FINAL PATHOLOGIC DIAGNOSIS: NORMAL
Lab: NORMAL

## 2021-04-28 ENCOUNTER — PATIENT MESSAGE (OUTPATIENT)
Dept: RESEARCH | Facility: HOSPITAL | Age: 26
End: 2021-04-28

## 2022-06-28 ENCOUNTER — LAB VISIT (OUTPATIENT)
Dept: LAB | Facility: HOSPITAL | Age: 27
End: 2022-06-28
Attending: FAMILY MEDICINE
Payer: MEDICAID

## 2022-06-28 ENCOUNTER — OFFICE VISIT (OUTPATIENT)
Dept: PRIMARY CARE CLINIC | Facility: CLINIC | Age: 27
End: 2022-06-28
Payer: MEDICAID

## 2022-06-28 VITALS
BODY MASS INDEX: 41.32 KG/M2 | HEIGHT: 65 IN | OXYGEN SATURATION: 100 % | DIASTOLIC BLOOD PRESSURE: 68 MMHG | WEIGHT: 248 LBS | TEMPERATURE: 98 F | HEART RATE: 84 BPM | SYSTOLIC BLOOD PRESSURE: 118 MMHG

## 2022-06-28 DIAGNOSIS — L30.9 PSORIASIS-ECZEMA OVERLAP CONDITION: ICD-10-CM

## 2022-06-28 DIAGNOSIS — Z00.01 ENCOUNTER FOR GENERAL ADULT MEDICAL EXAMINATION WITH ABNORMAL FINDINGS: Primary | ICD-10-CM

## 2022-06-28 DIAGNOSIS — L98.9 DISORDER OF SKIN AND SUBCUTANEOUS TISSUE: ICD-10-CM

## 2022-06-28 DIAGNOSIS — Z11.59 ENCOUNTER FOR HEPATITIS C SCREENING TEST FOR LOW RISK PATIENT: ICD-10-CM

## 2022-06-28 DIAGNOSIS — E66.01 MORBID OBESITY: ICD-10-CM

## 2022-06-28 DIAGNOSIS — Z00.01 ENCOUNTER FOR GENERAL ADULT MEDICAL EXAMINATION WITH ABNORMAL FINDINGS: ICD-10-CM

## 2022-06-28 DIAGNOSIS — Z11.4 ENCOUNTER FOR SCREENING FOR HIV: ICD-10-CM

## 2022-06-28 LAB
ALBUMIN SERPL BCP-MCNC: 4 G/DL (ref 3.5–5.2)
ALP SERPL-CCNC: 49 U/L (ref 55–135)
ALT SERPL W/O P-5'-P-CCNC: 13 U/L (ref 10–44)
ANION GAP SERPL CALC-SCNC: 8 MMOL/L (ref 8–16)
AST SERPL-CCNC: 15 U/L (ref 10–40)
BASOPHILS # BLD AUTO: 0.02 K/UL (ref 0–0.2)
BASOPHILS NFR BLD: 0.5 % (ref 0–1.9)
BILIRUB SERPL-MCNC: 0.3 MG/DL (ref 0.1–1)
BUN SERPL-MCNC: 10 MG/DL (ref 6–20)
CALCIUM SERPL-MCNC: 9.4 MG/DL (ref 8.7–10.5)
CHLORIDE SERPL-SCNC: 105 MMOL/L (ref 95–110)
CHOLEST SERPL-MCNC: 176 MG/DL (ref 120–199)
CHOLEST/HDLC SERPL: 3.3 {RATIO} (ref 2–5)
CO2 SERPL-SCNC: 25 MMOL/L (ref 23–29)
CREAT SERPL-MCNC: 0.7 MG/DL (ref 0.5–1.4)
DIFFERENTIAL METHOD: ABNORMAL
EOSINOPHIL # BLD AUTO: 0.1 K/UL (ref 0–0.5)
EOSINOPHIL NFR BLD: 1.8 % (ref 0–8)
ERYTHROCYTE [DISTWIDTH] IN BLOOD BY AUTOMATED COUNT: 13.7 % (ref 11.5–14.5)
EST. GFR  (AFRICAN AMERICAN): >60 ML/MIN/1.73 M^2
EST. GFR  (NON AFRICAN AMERICAN): >60 ML/MIN/1.73 M^2
ESTIMATED AVG GLUCOSE: 114 MG/DL (ref 68–131)
GLUCOSE SERPL-MCNC: 94 MG/DL (ref 70–110)
HBA1C MFR BLD: 5.6 % (ref 4–5.6)
HCT VFR BLD AUTO: 37.9 % (ref 37–48.5)
HDLC SERPL-MCNC: 54 MG/DL (ref 40–75)
HDLC SERPL: 30.7 % (ref 20–50)
HGB BLD-MCNC: 11.5 G/DL (ref 12–16)
IMM GRANULOCYTES # BLD AUTO: 0.01 K/UL (ref 0–0.04)
IMM GRANULOCYTES NFR BLD AUTO: 0.3 % (ref 0–0.5)
LDLC SERPL CALC-MCNC: 113 MG/DL (ref 63–159)
LYMPHOCYTES # BLD AUTO: 1.3 K/UL (ref 1–4.8)
LYMPHOCYTES NFR BLD: 33 % (ref 18–48)
MCH RBC QN AUTO: 25.6 PG (ref 27–31)
MCHC RBC AUTO-ENTMCNC: 30.3 G/DL (ref 32–36)
MCV RBC AUTO: 84 FL (ref 82–98)
MONOCYTES # BLD AUTO: 0.3 K/UL (ref 0.3–1)
MONOCYTES NFR BLD: 8.8 % (ref 4–15)
NEUTROPHILS # BLD AUTO: 2.1 K/UL (ref 1.8–7.7)
NEUTROPHILS NFR BLD: 55.6 % (ref 38–73)
NONHDLC SERPL-MCNC: 122 MG/DL
NRBC BLD-RTO: 0 /100 WBC
PLATELET # BLD AUTO: 299 K/UL (ref 150–450)
PMV BLD AUTO: 12.2 FL (ref 9.2–12.9)
POTASSIUM SERPL-SCNC: 3.6 MMOL/L (ref 3.5–5.1)
PROT SERPL-MCNC: 7.5 G/DL (ref 6–8.4)
RBC # BLD AUTO: 4.5 M/UL (ref 4–5.4)
SODIUM SERPL-SCNC: 138 MMOL/L (ref 136–145)
TRIGL SERPL-MCNC: 45 MG/DL (ref 30–150)
TSH SERPL DL<=0.005 MIU/L-ACNC: 0.8 UIU/ML (ref 0.4–4)
WBC # BLD AUTO: 3.85 K/UL (ref 3.9–12.7)

## 2022-06-28 PROCEDURE — 17266 DSTRJ MAL LES T/A/L >4.0 CM: CPT | Mod: PBBFAC,PN | Performed by: FAMILY MEDICINE

## 2022-06-28 PROCEDURE — 99214 OFFICE O/P EST MOD 30 MIN: CPT | Mod: PBBFAC,PN | Performed by: FAMILY MEDICINE

## 2022-06-28 PROCEDURE — 86803 HEPATITIS C AB TEST: CPT | Performed by: FAMILY MEDICINE

## 2022-06-28 PROCEDURE — 3074F PR MOST RECENT SYSTOLIC BLOOD PRESSURE < 130 MM HG: ICD-10-PCS | Mod: CPTII,,, | Performed by: FAMILY MEDICINE

## 2022-06-28 PROCEDURE — 99204 OFFICE O/P NEW MOD 45 MIN: CPT | Mod: 25,S$PBB,, | Performed by: FAMILY MEDICINE

## 2022-06-28 PROCEDURE — 3074F SYST BP LT 130 MM HG: CPT | Mod: CPTII,,, | Performed by: FAMILY MEDICINE

## 2022-06-28 PROCEDURE — 88312 SPECIAL STAINS GROUP 1: CPT | Mod: 26,,, | Performed by: PATHOLOGY

## 2022-06-28 PROCEDURE — 3008F PR BODY MASS INDEX (BMI) DOCUMENTED: ICD-10-PCS | Mod: CPTII,,, | Performed by: FAMILY MEDICINE

## 2022-06-28 PROCEDURE — 88305 TISSUE EXAM BY PATHOLOGIST: CPT | Performed by: PATHOLOGY

## 2022-06-28 PROCEDURE — 80061 LIPID PANEL: CPT | Performed by: FAMILY MEDICINE

## 2022-06-28 PROCEDURE — 3078F DIAST BP <80 MM HG: CPT | Mod: CPTII,,, | Performed by: FAMILY MEDICINE

## 2022-06-28 PROCEDURE — 3078F PR MOST RECENT DIASTOLIC BLOOD PRESSURE < 80 MM HG: ICD-10-PCS | Mod: CPTII,,, | Performed by: FAMILY MEDICINE

## 2022-06-28 PROCEDURE — 17266: ICD-10-PCS | Mod: S$PBB,,, | Performed by: FAMILY MEDICINE

## 2022-06-28 PROCEDURE — 99999 PR PBB SHADOW E&M-EST. PATIENT-LVL IV: ICD-10-PCS | Mod: PBBFAC,,, | Performed by: FAMILY MEDICINE

## 2022-06-28 PROCEDURE — 83036 HEMOGLOBIN GLYCOSYLATED A1C: CPT | Performed by: FAMILY MEDICINE

## 2022-06-28 PROCEDURE — 1159F PR MEDICATION LIST DOCUMENTED IN MEDICAL RECORD: ICD-10-PCS | Mod: CPTII,,, | Performed by: FAMILY MEDICINE

## 2022-06-28 PROCEDURE — 85025 COMPLETE CBC W/AUTO DIFF WBC: CPT | Performed by: FAMILY MEDICINE

## 2022-06-28 PROCEDURE — 36415 COLL VENOUS BLD VENIPUNCTURE: CPT | Mod: PN | Performed by: FAMILY MEDICINE

## 2022-06-28 PROCEDURE — 86038 ANTINUCLEAR ANTIBODIES: CPT | Performed by: FAMILY MEDICINE

## 2022-06-28 PROCEDURE — 99999 PR PBB SHADOW E&M-EST. PATIENT-LVL IV: CPT | Mod: PBBFAC,,, | Performed by: FAMILY MEDICINE

## 2022-06-28 PROCEDURE — 88305 TISSUE EXAM BY PATHOLOGIST: ICD-10-PCS | Mod: 26,,, | Performed by: PATHOLOGY

## 2022-06-28 PROCEDURE — 84443 ASSAY THYROID STIM HORMONE: CPT | Performed by: FAMILY MEDICINE

## 2022-06-28 PROCEDURE — 80053 COMPREHEN METABOLIC PANEL: CPT | Performed by: FAMILY MEDICINE

## 2022-06-28 PROCEDURE — 99204 PR OFFICE/OUTPT VISIT, NEW, LEVL IV, 45-59 MIN: ICD-10-PCS | Mod: 25,S$PBB,, | Performed by: FAMILY MEDICINE

## 2022-06-28 PROCEDURE — 1159F MED LIST DOCD IN RCRD: CPT | Mod: CPTII,,, | Performed by: FAMILY MEDICINE

## 2022-06-28 PROCEDURE — 87389 HIV-1 AG W/HIV-1&-2 AB AG IA: CPT | Performed by: FAMILY MEDICINE

## 2022-06-28 PROCEDURE — 88312 PR  SPECIAL STAINS,GROUP I: ICD-10-PCS | Mod: 26,,, | Performed by: PATHOLOGY

## 2022-06-28 PROCEDURE — 88305 TISSUE EXAM BY PATHOLOGIST: CPT | Mod: 26,,, | Performed by: PATHOLOGY

## 2022-06-28 PROCEDURE — 88312 SPECIAL STAINS GROUP 1: CPT | Performed by: PATHOLOGY

## 2022-06-28 PROCEDURE — 3008F BODY MASS INDEX DOCD: CPT | Mod: CPTII,,, | Performed by: FAMILY MEDICINE

## 2022-06-28 RX ORDER — FLUTICASONE PROPIONATE 0.5 MG/G
CREAM TOPICAL 2 TIMES DAILY
Qty: 60 G | Refills: 2 | Status: SHIPPED | OUTPATIENT
Start: 2022-06-28 | End: 2023-06-16

## 2022-06-28 NOTE — PROGRESS NOTES
Subjective:       Patient ID: Roger Juarez is a 27 y.o. female.    Chief Complaint: Establish Care (Possible Eczema on left foot/rash)      HPI   History of Present Illness:   Roger Juarez 27 y.o. female presents today with  Well Adult Physical: Patient here for a comprehensive physical exam.The patient reports problems - rash to the ankles and feet, recurrent, macerated gray, silvery. Have tried a couple of prescribed creams, resolves a little and then flares up.  Do you take any herbs or supplements that were not prescribed by a doctor? no Are you taking calcium supplements? no Are you taking aspirin daily? not applicable   History:  none     Past Medical History:   Diagnosis Date    Hand pain, right     Pre-eclampsia, postpartum      Family History   Problem Relation Age of Onset    Breast cancer Neg Hx     Colon cancer Neg Hx     Ovarian cancer Neg Hx      Social History     Socioeconomic History    Marital status: Single   Tobacco Use    Smoking status: Never Smoker    Smokeless tobacco: Never Used   Substance and Sexual Activity    Alcohol use: Not Currently     Alcohol/week: 0.0 standard drinks     Comment: occasional    Drug use: No    Sexual activity: Not Currently     Partners: Male     Birth control/protection: Implant     Comment: nexplanon 6/25/20     Outpatient Encounter Medications as of 6/28/2022   Medication Sig Dispense Refill    terconazole (TERAZOL 7) 0.4 % Crea       ferrous sulfate (FEOSOL) 325 mg (65 mg iron) Tab tablet TK 1 T PO BID      fluticasone propionate (CUTIVATE) 0.05 % cream Apply topically 2 (two) times daily. 60 g 2    HYDROcodone-acetaminophen (NORCO) 5-325 mg per tablet Take 1 tablet by mouth every 8 (eight) hours as needed for Pain (may alternate with ibuprofen). (Patient not taking: No sig reported) 10 tablet 0    ibuprofen (ADVIL,MOTRIN) 600 MG tablet       NIFEdipine (PROCARDIA-XL) 60 MG (OSM) 24 hr tablet Take 1 tablet  "(60 mg total) by mouth once daily. 30 tablet 1    PNV,calcium 72-iron-folic acid (PRENATAL VITAMIN PLUS LOW IRON) 27 mg iron- 1 mg Tab Take 1 tablet (1 each total) by mouth once daily. (Patient not taking: Reported on 6/15/2020) 30 tablet 11    promethazine (PHENERGAN) 25 MG tablet Take 1 tablet (25 mg total) by mouth every 4 (four) hours. (Patient not taking: No sig reported) 20 tablet 1     No facility-administered encounter medications on file as of 6/28/2022.       Review of Systems    Treatment options and alternatives were discussed with the patient. Patient was given ample time to ask questions. All questions were answered. Voices understanding and acceptance of this advice. Will call back if any further questions or concerns.  Objective:      /68 (BP Location: Left arm, Patient Position: Sitting, BP Method: Large (Manual))   Pulse 84   Temp 98.4 °F (36.9 °C) (Temporal)   Ht 5' 5" (1.651 m)   Wt 112.5 kg (248 lb)   SpO2 100%   BMI 41.27 kg/m²   Physical Exam  Pulmonary:      Effort: Pulmonary effort is normal.   Musculoskeletal:        Feet:    Skin:     Findings: Rash present. Rash is macular.   Neurological:      Mental Status: She is alert.         CONSTITUTIONAL: No apparent distress. Appears comfortable. Does not appear acutely ill or septic. Appears adequately hydrated.  CARDIOVASCULAR: No perioral cyanosis  PULMONARY: Breathing unlabored. No retractions Chest expansion grossly normal.  PSYCHIATRIC: Alert and conversant and grossly oriented. Mood is grossly neutral. Affect appropriate. Judgment and insight grossly intact.  NEUROLOGIC: No focal sensory deficits reported.   Results for orders placed or performed in visit on 06/25/20   Liquid-Based Pap Smear, Screening   Result Value Ref Range    Final Pathologic Diagnosis       Specimen Adequacy  Satisfactory for interpretation. Endocervical component is absent.  [place] Category  Negative for intraepithelial lesion or malignancy.      " Disclaimer       The Pap smear is a screening test that aids in the detection of cervical  cancer and cancer precursors. Both false positive and false negative results  can occur. The test should be used at regular intervals, and positive results  should be confirmed before definitive therapy.  This liquid based specimen is processed using the  or  Thin PrepPAP  System. This specimen has been analyzed by the ThinPrep Imaging System  (NCLC), an automated imaging and review system which assists  the laboratory in evaluating cells on ThinPrep PAP tests. Following automated  imaging, selected fields from every slide are reviewed by a cytotechnologist  and/or pathologist.       Assessment:       1. Encounter for general adult medical examination with abnormal findings    2. Disorder of skin and subcutaneous tissue    3. Morbid obesity    4. BMI 40.0-44.9, adult    5. Encounter for screening for HIV    6. Encounter for hepatitis C screening test for low risk patient    7. Psoriasis-eczema overlap condition        Plan:   Encounter for general adult medical examination with abnormal findings  -     CBC Auto Differential; Future; Expected date: 06/28/2022  -     Comprehensive Metabolic Panel; Future; Expected date: 06/28/2022  -     Lipid Panel; Future; Expected date: 06/28/2022    Disorder of skin and subcutaneous tissue  -     MADYSON Screen w/Reflex; Future; Expected date: 06/28/2022  -     Specimen to Pathology, Dermatology  -     Punch biopsy left medial ankle    Morbid obesity  -     Hemoglobin A1C; Future; Expected date: 06/28/2022  -     TSH; Future; Expected date: 06/28/2022    BMI 40.0-44.9, adult  -     Hemoglobin A1C; Future; Expected date: 06/28/2022  -     TSH; Future; Expected date: 06/28/2022    Encounter for screening for HIV  -     HIV 1/2 Ag/Ab (4th Gen); Future; Expected date: 06/28/2022    Encounter for hepatitis C screening test for low risk patient  -     Hepatitis C Antibody;  Future; Expected date: 06/28/2022    Psoriasis-eczema overlap condition  Comments:  suspected  Orders:  -     fluticasone propionate (CUTIVATE) 0.05 % cream; Apply topically 2 (two) times daily.  Dispense: 60 g; Refill: 2      Treatment options and alternatives were discussed with the patient. Patient was given ample time to ask questions. All questions were answered. Voices understanding and acceptance of this advice. Will call back if any further questions or concerns.  Sade Kelly MD

## 2022-06-28 NOTE — PROCEDURES
"Punch biopsy left medial ankle    Date/Time: 6/28/2022 2:00 PM  Performed by: Sade Kelly MD  Authorized by: Sade Kelly MD     Consent Done?:  Yes (Written)  Time out: Immediately prior to procedure a "time out" was called to verify the correct patient, procedure, equipment, support staff and site/side marked as required.      Indications:  Other (recurrent rash)  Body area:  Lower leg / ankle  Laterality:  Left  Position:  Supine  Preparation: Patient was prepped and draped in usual sterile fashion    Local anesthetic:  Lidocaine 1% without epinephrine  Lesion size (cm):  10  Sterile dressings:  Gauze and Vaseline jelly  Bleeding:  Minimal  Hemostasis Achieved: Yes    Method Used:  Pressure   Patient tolerated the procedure well with no immediate complications.   Post-operative instructions were provided for the patient.   Patient was discharged and will follow up for wound check.      "

## 2022-06-29 LAB
ANA SER QL IF: NORMAL
HCV AB SERPL QL IA: NEGATIVE
HIV 1+2 AB+HIV1 P24 AG SERPL QL IA: NEGATIVE

## 2022-06-29 NOTE — PROGRESS NOTES
I have reviewed all your lab results.   Blood count shows that you have anemia,   kidney function, liver function, electrolytes,and thyroid function are all normal.  Your A1C is normal, therefore you do not have diabetes.  Age appropriate Hep C screen is negative.   Age appropriate HIV screen is negative   Cholesterol is normal  Autoimmune screen is negative  -Maintain/Continue a heathy lifestyle.  Choose a diet rich in fruits, vegetables, and low-fat dairy products, but low in meats, sweets, and refined grains   Be more active. If you are able, walk for 35 mins 5 times a week.      If you have an appt for result review, please keep it, so we can discuss details and plan of care.  Please do not hesitate to contact us if you have any questions.

## 2022-07-05 ENCOUNTER — CLINICAL SUPPORT (OUTPATIENT)
Dept: PRIMARY CARE CLINIC | Facility: CLINIC | Age: 27
End: 2022-07-05
Payer: MEDICAID

## 2022-07-05 DIAGNOSIS — L98.9 DISORDER OF SKIN AND SUBCUTANEOUS TISSUE: Primary | ICD-10-CM

## 2022-07-05 NOTE — PROGRESS NOTES
Patient in for suture removal to ankle. She stated no stitch it was pulled out but she wanted to come in anyway and have site looked at. No stitch noted small scab no open wound, Dr Kelly informed Patient will follow up as needed.

## 2022-07-07 LAB
FINAL PATHOLOGIC DIAGNOSIS: NORMAL
GROSS: NORMAL
Lab: NORMAL
MICROSCOPIC EXAM: NORMAL

## 2022-07-08 ENCOUNTER — TELEPHONE (OUTPATIENT)
Dept: PRIMARY CARE CLINIC | Facility: CLINIC | Age: 27
End: 2022-07-08
Payer: MEDICAID

## 2022-07-08 NOTE — TELEPHONE ENCOUNTER
----- Message from Sade Kelly MD sent at 7/7/2022 10:45 PM CDT -----  Pathology report shows that it is eczema. May take up to 16 weeks to clear with topical steroid.

## 2022-07-08 NOTE — TELEPHONE ENCOUNTER
I called the patient to inform her of labs, patient verbalized her understanding with no further questions.

## 2022-11-23 ENCOUNTER — OFFICE VISIT (OUTPATIENT)
Dept: PRIMARY CARE CLINIC | Facility: CLINIC | Age: 27
End: 2022-11-23
Payer: MEDICAID

## 2022-11-23 DIAGNOSIS — I10 ESSENTIAL HYPERTENSION: ICD-10-CM

## 2022-11-23 DIAGNOSIS — G89.29 CHRONIC MIDLINE LOW BACK PAIN WITHOUT SCIATICA: Primary | ICD-10-CM

## 2022-11-23 DIAGNOSIS — M54.50 CHRONIC MIDLINE LOW BACK PAIN WITHOUT SCIATICA: Primary | ICD-10-CM

## 2022-11-23 DIAGNOSIS — L30.9 PSORIASIS-ECZEMA OVERLAP CONDITION: ICD-10-CM

## 2022-11-23 PROCEDURE — 99214 PR OFFICE/OUTPT VISIT, EST, LEVL IV, 30-39 MIN: ICD-10-PCS | Mod: 95,,, | Performed by: FAMILY MEDICINE

## 2022-11-23 PROCEDURE — 3044F PR MOST RECENT HEMOGLOBIN A1C LEVEL <7.0%: ICD-10-PCS | Mod: CPTII,95,, | Performed by: FAMILY MEDICINE

## 2022-11-23 PROCEDURE — 99214 OFFICE O/P EST MOD 30 MIN: CPT | Mod: 95,,, | Performed by: FAMILY MEDICINE

## 2022-11-23 PROCEDURE — 3044F HG A1C LEVEL LT 7.0%: CPT | Mod: CPTII,95,, | Performed by: FAMILY MEDICINE

## 2022-11-23 RX ORDER — NAPROXEN 500 MG/1
500 TABLET ORAL EVERY 12 HOURS PRN
Qty: 30 TABLET | Refills: 1 | Status: SHIPPED | OUTPATIENT
Start: 2022-11-23 | End: 2022-12-08

## 2022-11-23 RX ORDER — METHOCARBAMOL 750 MG/1
750 TABLET, FILM COATED ORAL EVERY 12 HOURS PRN
Qty: 30 TABLET | Refills: 2 | Status: SHIPPED | OUTPATIENT
Start: 2022-11-23 | End: 2022-12-08

## 2022-11-23 NOTE — PROGRESS NOTES
Subjective:    The patient location is: [place] in her parked car  Visit type: Virtual visit with synchronous audio and video  Total time spent with patient: 20 mins  Each patient to whom he or she provides medical services by telemedicine is:  (1) informed of the relationship between the physician and patient and the respective role of any other health care provider with respect to management of the patient; and (2) notified that he or she may decline to receive medical services by telemedicine and may withdraw from such care at any time.       Patient ID: Roger Juarez is a 27 y.o. female.    Chief Complaint:low back pain      HPI   History of Present Illness:   Roger Juarez 27 y.o. female presents today with   She was 15 mins late logging in .  New complaint  Location: low back , mid back   Onset: 6 weeks ago  Provocation: works in a salon, hurts in the morning  Quality: ache and now cramping to the front of her   Radiation: none  Severity:1/10  Aggravating  Alleviating:   Treatment so far:Ibuprofen  She is on her way to a chiropractor    Eczema-psoriasis to the feet: not improving despite prescribed therapy.     Answers submitted by the patient for this visit:  Back Pain Questionnaire (Submitted on 11/23/2022)  Chief Complaint: Back pain  genital pain: No  Past Medical History:   Diagnosis Date    Hand pain, right     Pre-eclampsia, postpartum      Family History   Problem Relation Age of Onset    Breast cancer Neg Hx     Colon cancer Neg Hx     Ovarian cancer Neg Hx      Social History     Socioeconomic History    Marital status: Single   Tobacco Use    Smoking status: Never    Smokeless tobacco: Never   Substance and Sexual Activity    Alcohol use: Not Currently     Alcohol/week: 0.0 standard drinks     Comment: occasional    Drug use: No    Sexual activity: Not Currently     Partners: Male     Birth control/protection: Implant     Comment: nexplanon 6/25/20     Outpatient Encounter  Medications as of 11/23/2022   Medication Sig Dispense Refill    ferrous sulfate (FEOSOL) 325 mg (65 mg iron) Tab tablet TK 1 T PO BID      fluticasone propionate (CUTIVATE) 0.05 % cream Apply topically 2 (two) times daily. 60 g 2    HYDROcodone-acetaminophen (NORCO) 5-325 mg per tablet Take 1 tablet by mouth every 8 (eight) hours as needed for Pain (may alternate with ibuprofen). (Patient not taking: No sig reported) 10 tablet 0    methocarbamoL (ROBAXIN) 750 MG Tab Take 1 tablet (750 mg total) by mouth every 12 (twelve) hours as needed (spasm). 30 tablet 2    naproxen (NAPROSYN) 500 MG tablet Take 1 tablet (500 mg total) by mouth every 12 (twelve) hours as needed (back pain). 30 tablet 1    NIFEdipine (PROCARDIA-XL) 60 MG (OSM) 24 hr tablet Take 1 tablet (60 mg total) by mouth once daily. 30 tablet 1    PNV,calcium 72-iron-folic acid (PRENATAL VITAMIN PLUS LOW IRON) 27 mg iron- 1 mg Tab Take 1 tablet (1 each total) by mouth once daily. (Patient not taking: Reported on 6/15/2020) 30 tablet 11    promethazine (PHENERGAN) 25 MG tablet Take 1 tablet (25 mg total) by mouth every 4 (four) hours. (Patient not taking: No sig reported) 20 tablet 1    terconazole (TERAZOL 7) 0.4 % Crea       [DISCONTINUED] ibuprofen (ADVIL,MOTRIN) 600 MG tablet        No facility-administered encounter medications on file as of 11/23/2022.       Review of Systems    Review of Systems      A complete 10 point ROS was completed and are positive as per above HPI.    Otherwise negative for fever, diplopia, chest pain, shortness of breath, vomiting, blood in urine, joint pain, skin rash, seizures and unusual bleeding.       Objective:      There were no vitals taken for this visit.  Physical Exam    CONSTITUTIONAL: No apparent distress. Appears comfortable.   CARDIOVASCULAR: No perioral cyanosis  PULMONARY: Breathing unlabored. No retractions Chest expansion grossly normal.  PSYCHIATRIC: Alert and conversant and grossly oriented. Mood is grossly  neutral. Affect appropriate. Judgment and insight grossly intact.  NEUROLOGIC: No focal sensory deficits reported.     Results for orders placed or performed in visit on 06/28/22   CBC Auto Differential   Result Value Ref Range    WBC 3.85 (L) 3.90 - 12.70 K/uL    RBC 4.50 4.00 - 5.40 M/uL    Hemoglobin 11.5 (L) 12.0 - 16.0 g/dL    Hematocrit 37.9 37.0 - 48.5 %    MCV 84 82 - 98 fL    MCH 25.6 (L) 27.0 - 31.0 pg    MCHC 30.3 (L) 32.0 - 36.0 g/dL    RDW 13.7 11.5 - 14.5 %    Platelets 299 150 - 450 K/uL    MPV 12.2 9.2 - 12.9 fL    Immature Granulocytes 0.3 0.0 - 0.5 %    Gran # (ANC) 2.1 1.8 - 7.7 K/uL    Immature Grans (Abs) 0.01 0.00 - 0.04 K/uL    Lymph # 1.3 1.0 - 4.8 K/uL    Mono # 0.3 0.3 - 1.0 K/uL    Eos # 0.1 0.0 - 0.5 K/uL    Baso # 0.02 0.00 - 0.20 K/uL    nRBC 0 0 /100 WBC    Gran % 55.6 38.0 - 73.0 %    Lymph % 33.0 18.0 - 48.0 %    Mono % 8.8 4.0 - 15.0 %    Eosinophil % 1.8 0.0 - 8.0 %    Basophil % 0.5 0.0 - 1.9 %    Differential Method Automated    Comprehensive Metabolic Panel   Result Value Ref Range    Sodium 138 136 - 145 mmol/L    Potassium 3.6 3.5 - 5.1 mmol/L    Chloride 105 95 - 110 mmol/L    CO2 25 23 - 29 mmol/L    Glucose 94 70 - 110 mg/dL    BUN 10 6 - 20 mg/dL    Creatinine 0.7 0.5 - 1.4 mg/dL    Calcium 9.4 8.7 - 10.5 mg/dL    Total Protein 7.5 6.0 - 8.4 g/dL    Albumin 4.0 3.5 - 5.2 g/dL    Total Bilirubin 0.3 0.1 - 1.0 mg/dL    Alkaline Phosphatase 49 (L) 55 - 135 U/L    AST 15 10 - 40 U/L    ALT 13 10 - 44 U/L    Anion Gap 8 8 - 16 mmol/L    eGFR if African American >60.0 >60 mL/min/1.73 m^2    eGFR if non African American >60.0 >60 mL/min/1.73 m^2   Lipid Panel   Result Value Ref Range    Cholesterol 176 120 - 199 mg/dL    Triglycerides 45 30 - 150 mg/dL    HDL 54 40 - 75 mg/dL    LDL Cholesterol 113.0 63.0 - 159.0 mg/dL    HDL/Cholesterol Ratio 30.7 20.0 - 50.0 %    Total Cholesterol/HDL Ratio 3.3 2.0 - 5.0    Non-HDL Cholesterol 122 mg/dL   Hemoglobin A1C   Result Value Ref Range     Hemoglobin A1C 5.6 4.0 - 5.6 %    Estimated Avg Glucose 114 68 - 131 mg/dL   TSH   Result Value Ref Range    TSH 0.801 0.400 - 4.000 uIU/mL   Hepatitis C Antibody   Result Value Ref Range    Hepatitis C Ab Negative Negative   HIV 1/2 Ag/Ab (4th Gen)   Result Value Ref Range    HIV 1/2 Ag/Ab Negative Negative   MADYSON Screen w/Reflex   Result Value Ref Range    MADYSON Screen Negative <1:80 Negative <1:80     Assessment:       1. Chronic midline low back pain without sciatica    2. Essential hypertension    3. Psoriasis-eczema overlap condition        Plan:   Chronic midline low back pain without sciatica  -     naproxen (NAPROSYN) 500 MG tablet; Take 1 tablet (500 mg total) by mouth every 12 (twelve) hours as needed (back pain).  Dispense: 30 tablet; Refill: 1  -     methocarbamoL (ROBAXIN) 750 MG Tab; Take 1 tablet (750 mg total) by mouth every 12 (twelve) hours as needed (spasm).  Dispense: 30 tablet; Refill: 2    Essential hypertension  -     [facility] Enrollment Order  -     Hypertension Digital Medicine ([facility] [place] Assign Onboarding Questionnaires    Psoriasis-eczema overlap condition  -     Ambulatory referral/consult to Dermatology; Future; Expected date: 11/30/2022      Treatment options and alternatives were discussed with the patient. Patient was given ample time to ask questions. All questions were answered. Voices understanding and acceptance of this advice. Will call back if any further questions or concerns.    Sade Kelly MD

## 2023-03-13 ENCOUNTER — PATIENT MESSAGE (OUTPATIENT)
Dept: PRIMARY CARE CLINIC | Facility: CLINIC | Age: 28
End: 2023-03-13
Payer: MEDICAID

## 2023-03-16 ENCOUNTER — PATIENT MESSAGE (OUTPATIENT)
Dept: PRIMARY CARE CLINIC | Facility: CLINIC | Age: 28
End: 2023-03-16

## 2023-03-22 ENCOUNTER — OFFICE VISIT (OUTPATIENT)
Dept: PRIMARY CARE CLINIC | Facility: CLINIC | Age: 28
End: 2023-03-22
Payer: MEDICAID

## 2023-03-22 DIAGNOSIS — L30.9 PSORIASIS-ECZEMA OVERLAP CONDITION: Primary | ICD-10-CM

## 2023-03-22 PROCEDURE — 99214 OFFICE O/P EST MOD 30 MIN: CPT | Mod: 95,,, | Performed by: FAMILY MEDICINE

## 2023-03-22 PROCEDURE — 99214 PR OFFICE/OUTPT VISIT, EST, LEVL IV, 30-39 MIN: ICD-10-PCS | Mod: 95,,, | Performed by: FAMILY MEDICINE

## 2023-03-22 RX ORDER — CLOBETASOL PROPIONATE 0.5 MG/G
OINTMENT TOPICAL 2 TIMES DAILY
Qty: 60 G | Refills: 2 | Status: SHIPPED | OUTPATIENT
Start: 2023-03-22 | End: 2023-06-16

## 2023-03-22 RX ORDER — DESONIDE 0.5 MG/G
CREAM TOPICAL 2 TIMES DAILY
Qty: 60 G | Refills: 2 | Status: SHIPPED | OUTPATIENT
Start: 2023-03-22 | End: 2023-06-16

## 2023-03-22 NOTE — PROGRESS NOTES
Subjective:    The patient location is: [place], sitting in her parked car  Visit type: Virtual visit with synchronous audio and video  Total time spent with patient:30 mins  This includes face to face time and non-face to face time preparing to see the patient (eg, review of tests), obtaining and/or reviewing separately obtained history, documenting clinical information in the electronic or other health record, independently interpreting results and communicating results to the patient/family/caregiver, or care coordinator.   Each patient to whom he or she provides medical services by telemedicine is:  (1) informed of the relationship between the physician and patient and the respective role of any other health care provider with respect to management of the patient; and (2) notified that he or she may decline to receive medical services by telemedicine and may withdraw from such care at any time.   Patient ID: Roger Juarez is a 28 y.o. female.    Chief Complaint: Rash        History of Present Illness:   Roger Juarez 28 y.o. female presents today with     Eczema-Psoriasis overlap rash to feet: chronic. Worsening despite prescribed cream. Asso with itching.  Asking to get clobetasol-will pay forit if her insurance will not cover.    Answers submitted by the patient for this visit:  Rash Questionnaire (Submitted on 3/22/2023)  Chief Complaint: Rash  Chronicity: recurrent  Onset: more than 1 year ago  Progression since onset: gradually worsening  Affected locations: left foot, right foot  Characteristics: blistering, itchiness, scaling  Exposed to: unknown  anorexia: No  facial edema: No  joint pain: No  nail changes: No  Treatments tried: anti-itch cream, moisturizer  Improvement on treatment: no relief  asthma: No  allergies: No  eczema: Yes  varicella: No    Past Medical History:   Diagnosis Date    Hand pain, right     Pre-eclampsia, postpartum      Family History   Problem Relation Age  of Onset    Breast cancer Neg Hx     Colon cancer Neg Hx     Ovarian cancer Neg Hx      Social History     Socioeconomic History    Marital status: Single   Tobacco Use    Smoking status: Never    Smokeless tobacco: Never   Substance and Sexual Activity    Alcohol use: Not Currently     Alcohol/week: 0.0 standard drinks     Comment: occasional    Drug use: No    Sexual activity: Not Currently     Partners: Male     Birth control/protection: Implant     Comment: nexplanon 6/25/20     Outpatient Encounter Medications as of 3/22/2023   Medication Sig Dispense Refill    clobetasol 0.05% (TEMOVATE) 0.05 % Oint Apply topically 2 (two) times daily. 60 g 2    desonide (DESOWEN) 0.05 % cream Apply topically 2 (two) times daily. 60 g 2    ferrous sulfate (FEOSOL) 325 mg (65 mg iron) Tab tablet TK 1 T PO BID      fluticasone propionate (CUTIVATE) 0.05 % cream Apply topically 2 (two) times daily. 60 g 2    HYDROcodone-acetaminophen (NORCO) 5-325 mg per tablet Take 1 tablet by mouth every 8 (eight) hours as needed for Pain (may alternate with ibuprofen). (Patient not taking: No sig reported) 10 tablet 0    NIFEdipine (PROCARDIA-XL) 60 MG (OSM) 24 hr tablet Take 1 tablet (60 mg total) by mouth once daily. 30 tablet 1    PNV,calcium 72-iron-folic acid (PRENATAL VITAMIN PLUS LOW IRON) 27 mg iron- 1 mg Tab Take 1 tablet (1 each total) by mouth once daily. (Patient not taking: Reported on 6/15/2020) 30 tablet 11    promethazine (PHENERGAN) 25 MG tablet Take 1 tablet (25 mg total) by mouth every 4 (four) hours. (Patient not taking: No sig reported) 20 tablet 1    terconazole (TERAZOL 7) 0.4 % Crea        No facility-administered encounter medications on file as of 3/22/2023.       Review of Systems    Review of Systems      A complete 10 point ROS was completed and are positive as per above HPI.  Otherwise negative for fever, diplopia, chest pain, shortness of breath, vomiting, blood in urine, joint pain,   seizures and unusual  bleeding.     Objective:      There were no vitals taken for this visit.  Physical Exam    CONSTITUTIONAL: No apparent distress.   CARDIOVASCULAR: No perioral cyanosis  PULMONARY: Breathing unlabored. No retractions Chest expansion grossly normal.  PSYCHIATRIC: Alert and conversant and grossly oriented. Affect is withdrawn.  NEUROLOGIC: No focal sensory deficits reported.   Results for orders placed or performed in visit on 06/28/22   CBC Auto Differential   Result Value Ref Range    WBC 3.85 (L) 3.90 - 12.70 K/uL    RBC 4.50 4.00 - 5.40 M/uL    Hemoglobin 11.5 (L) 12.0 - 16.0 g/dL    Hematocrit 37.9 37.0 - 48.5 %    MCV 84 82 - 98 fL    MCH 25.6 (L) 27.0 - 31.0 pg    MCHC 30.3 (L) 32.0 - 36.0 g/dL    RDW 13.7 11.5 - 14.5 %    Platelets 299 150 - 450 K/uL    MPV 12.2 9.2 - 12.9 fL    Immature Granulocytes 0.3 0.0 - 0.5 %    Gran # (ANC) 2.1 1.8 - 7.7 K/uL    Immature Grans (Abs) 0.01 0.00 - 0.04 K/uL    Lymph # 1.3 1.0 - 4.8 K/uL    Mono # 0.3 0.3 - 1.0 K/uL    Eos # 0.1 0.0 - 0.5 K/uL    Baso # 0.02 0.00 - 0.20 K/uL    nRBC 0 0 /100 WBC    Gran % 55.6 38.0 - 73.0 %    Lymph % 33.0 18.0 - 48.0 %    Mono % 8.8 4.0 - 15.0 %    Eosinophil % 1.8 0.0 - 8.0 %    Basophil % 0.5 0.0 - 1.9 %    Differential Method Automated    Comprehensive Metabolic Panel   Result Value Ref Range    Sodium 138 136 - 145 mmol/L    Potassium 3.6 3.5 - 5.1 mmol/L    Chloride 105 95 - 110 mmol/L    CO2 25 23 - 29 mmol/L    Glucose 94 70 - 110 mg/dL    BUN 10 6 - 20 mg/dL    Creatinine 0.7 0.5 - 1.4 mg/dL    Calcium 9.4 8.7 - 10.5 mg/dL    Total Protein 7.5 6.0 - 8.4 g/dL    Albumin 4.0 3.5 - 5.2 g/dL    Total Bilirubin 0.3 0.1 - 1.0 mg/dL    Alkaline Phosphatase 49 (L) 55 - 135 U/L    AST 15 10 - 40 U/L    ALT 13 10 - 44 U/L    Anion Gap 8 8 - 16 mmol/L    eGFR if African American >60.0 >60 mL/min/1.73 m^2    eGFR if non African American >60.0 >60 mL/min/1.73 m^2   Lipid Panel   Result Value Ref Range    Cholesterol 176 120 - 199 mg/dL     Triglycerides 45 30 - 150 mg/dL    HDL 54 40 - 75 mg/dL    LDL Cholesterol 113.0 63.0 - 159.0 mg/dL    HDL/Cholesterol Ratio 30.7 20.0 - 50.0 %    Total Cholesterol/HDL Ratio 3.3 2.0 - 5.0    Non-HDL Cholesterol 122 mg/dL   Hemoglobin A1C   Result Value Ref Range    Hemoglobin A1C 5.6 4.0 - 5.6 %    Estimated Avg Glucose 114 68 - 131 mg/dL   TSH   Result Value Ref Range    TSH 0.801 0.400 - 4.000 uIU/mL   Hepatitis C Antibody   Result Value Ref Range    Hepatitis C Ab Negative Negative   HIV 1/2 Ag/Ab (4th Gen)   Result Value Ref Range    HIV 1/2 Ag/Ab Negative Negative   MADYSON Screen w/Reflex   Result Value Ref Range    MADYSON Screen Negative <1:80 Negative <1:80     Assessment:       1. Psoriasis-eczema overlap condition        Plan:   Psoriasis-eczema overlap condition  Comments:  Flare up of chronic condition,   Orders:  -     clobetasol 0.05% (TEMOVATE) 0.05 % Oint; Apply topically 2 (two) times daily.  Dispense: 60 g; Refill: 2  -     desonide (DESOWEN) 0.05 % cream; Apply topically 2 (two) times daily.  Dispense: 60 g; Refill: 2    I have reviewed all of the patient's clinical history available in care everywhere and Epic and have utilized this in my evaluation and management recommendations today.   Treatment options and alternatives were discussed with the patient. Patient was given ample time to ask questions. All questions were answered. Voices understanding and acceptance of this advice. Will call back if any further questions or concerns.  Sade Kelly MD

## 2023-06-16 ENCOUNTER — OFFICE VISIT (OUTPATIENT)
Dept: OBSTETRICS AND GYNECOLOGY | Facility: CLINIC | Age: 28
End: 2023-06-16
Payer: MEDICAID

## 2023-06-16 VITALS — WEIGHT: 257.69 LBS | BODY MASS INDEX: 42.93 KG/M2 | HEIGHT: 65 IN

## 2023-06-16 DIAGNOSIS — Z01.419 ENCOUNTER FOR ANNUAL ROUTINE GYNECOLOGICAL EXAMINATION: Primary | ICD-10-CM

## 2023-06-16 DIAGNOSIS — Z12.4 CERVICAL CANCER SCREENING: ICD-10-CM

## 2023-06-16 DIAGNOSIS — Z30.46 ENCOUNTER FOR SURVEILLANCE OF IMPLANTABLE SUBDERMAL CONTRACEPTIVE: ICD-10-CM

## 2023-06-16 PROCEDURE — 88175 CYTOPATH C/V AUTO FLUID REDO: CPT

## 2023-06-16 PROCEDURE — 1160F PR REVIEW ALL MEDS BY PRESCRIBER/CLIN PHARMACIST DOCUMENTED: ICD-10-PCS | Mod: CPTII,,,

## 2023-06-16 PROCEDURE — 99999 PR PBB SHADOW E&M-EST. PATIENT-LVL II: CPT | Mod: PBBFAC,,,

## 2023-06-16 PROCEDURE — 99212 OFFICE O/P EST SF 10 MIN: CPT | Mod: PBBFAC

## 2023-06-16 PROCEDURE — 99385 PREV VISIT NEW AGE 18-39: CPT | Mod: S$PBB,,,

## 2023-06-16 PROCEDURE — 99385 PR PREVENTIVE VISIT,NEW,18-39: ICD-10-PCS | Mod: S$PBB,,,

## 2023-06-16 PROCEDURE — 3008F PR BODY MASS INDEX (BMI) DOCUMENTED: ICD-10-PCS | Mod: CPTII,,,

## 2023-06-16 PROCEDURE — 1159F PR MEDICATION LIST DOCUMENTED IN MEDICAL RECORD: ICD-10-PCS | Mod: CPTII,,,

## 2023-06-16 PROCEDURE — 1159F MED LIST DOCD IN RCRD: CPT | Mod: CPTII,,,

## 2023-06-16 PROCEDURE — 1160F RVW MEDS BY RX/DR IN RCRD: CPT | Mod: CPTII,,,

## 2023-06-16 PROCEDURE — 87624 HPV HI-RISK TYP POOLED RSLT: CPT

## 2023-06-16 PROCEDURE — 99999 PR PBB SHADOW E&M-EST. PATIENT-LVL II: ICD-10-PCS | Mod: PBBFAC,,,

## 2023-06-16 PROCEDURE — 3008F BODY MASS INDEX DOCD: CPT | Mod: CPTII,,,

## 2023-06-16 RX ORDER — IBUPROFEN 600 MG/1
600 TABLET ORAL
COMMUNITY
Start: 2023-04-09

## 2023-06-16 RX ORDER — METHOCARBAMOL 500 MG/1
750 TABLET, FILM COATED ORAL 4 TIMES DAILY
COMMUNITY

## 2023-06-16 NOTE — PROGRESS NOTES
Subjective:       Patient ID: Roger Juarez is a 28 y.o. female.    Chief Complaint:  Well Woman      History of Present Illness  HPI  Annual Exam-Premenopausal  Patient presents for annual exam. The patient has no complaints today. The patient is sexually active, Nexplanon for contraception, expires 23, would like to order new device.. GYN screening history: last pap: approximate date 2020 and was normal. The patient wears seatbelts: yes. The patient participates in regular exercise: yes. Has the patient ever been transfused or tattooed?: yes, tattoos. The patient reports that there is not domestic violence in her life. Monthly menses, lasting 3-4 days, heavy flow, cramping severe, managed with NSAIDS.     GYN & OB History  Patient's last menstrual period was 2023.   Date of Last Pap: 2020    OB History    Para Term  AB Living   2 2 2     3   SAB IAB Ectopic Multiple Live Births         1 3      # Outcome Date GA Lbr Miguel/2nd Weight Sex Delivery Anes PTL Lv   2A Term 20 38w0d  3.47 kg (7 lb 10.4 oz) M CS-LTranv Spinal N NATALI   2B Term 20 38w0d  2.76 kg (6 lb 1.4 oz) F CS-LTranv Spinal N NATALI   1 Term 16 38w6d  3.124 kg (6 lb 14.2 oz) M CS-LTranv EPI N NATALI      Complications: Fetal Intolerance       Review of Systems  Review of Systems   Constitutional: Negative.    HENT: Negative.     Eyes: Negative.    Respiratory: Negative.     Cardiovascular: Negative.    Gastrointestinal: Negative.    Genitourinary: Negative.    Musculoskeletal: Negative.    Integumentary:  Negative.   Neurological: Negative.    Hematological: Negative.    Psychiatric/Behavioral: Negative.     All other systems reviewed and are negative.  Breast: negative.          Objective:      Physical Exam:   Constitutional: She is oriented to person, place, and time. She appears well-developed and well-nourished.    HENT:   Head: Normocephalic and atraumatic.   Nose: Nose normal.    Eyes: Pupils  are equal, round, and reactive to light. Conjunctivae and EOM are normal.     Cardiovascular:  Normal rate and regular rhythm.             Pulmonary/Chest: Effort normal. She has no decreased breath sounds. She has no rhonchi. Right breast exhibits no inverted nipple, no mass, no nipple discharge, no tenderness and no bleeding. Left breast exhibits no inverted nipple, no mass, no nipple discharge, no tenderness and no bleeding. Breasts are symmetrical.        Abdominal: Soft. There is no abdominal tenderness.     Genitourinary:    Inguinal canal, vagina, uterus, right adnexa, left adnexa and rectum normal.      Pelvic exam was performed with patient supine.   The external female genitalia was normal.   No external genitalia lesions identified,Genitalia hair distrobution normal .   Labial bartholins normal.Cervix is normal. Right adnexum displays no mass and no tenderness. Left adnexum displays no mass and no tenderness. No  no vaginal discharge, tenderness or bleeding in the vagina. Vagina was moist.Cervix exhibits no motion tenderness, no discharge and no tenderness.    pap smear completedUerus contour normal  Uterus is not tender. Normal urethral meatus.          Musculoskeletal: Normal range of motion and moves all extremeties.        Arms:        Neurological: She is alert and oriented to person, place, and time.    Skin: Skin is warm and dry.    Psychiatric: She has a normal mood and affect. Her speech is normal and behavior is normal. Mood, judgment and thought content normal.           Assessment:        1. Encounter for annual routine gynecological examination    2. Cervical cancer screening    3. Encounter for surveillance of implantable subdermal contraceptive               Plan:   Continue annual well woman exam.  Pap collected. Patient was counseled today on ASCCP screening guidelines (pap smear every 3 years in low risk patients) and recommendations for annual pelvic exams and clinical breast exams,  yearly mammograms starting at age 40; and to see her PCP for other healthcare maintenance.   Encouraged diet, exercise, and weight loss       Diagnosis and orders this visit:  Encounter for annual routine gynecological examination    Cervical cancer screening  -     Liquid-Based Pap Smear, Screening  -     HPV High Risk Genotypes, PCR    Encounter for surveillance of implantable subdermal contraceptive  -     Device Authorization Order  - Once approved, patient will schedule removal and reinsertion procedure      Mi Salazar NP

## 2023-06-19 DIAGNOSIS — B37.31 VAGINAL CANDIDIASIS: Primary | ICD-10-CM

## 2023-06-19 LAB
FINAL PATHOLOGIC DIAGNOSIS: NORMAL
Lab: NORMAL

## 2023-06-19 RX ORDER — FLUCONAZOLE 150 MG/1
150 TABLET ORAL
Qty: 2 TABLET | Refills: 0 | Status: SHIPPED | OUTPATIENT
Start: 2023-06-19 | End: 2023-06-23

## 2023-06-21 LAB
HPV HR 12 DNA SPEC QL NAA+PROBE: NEGATIVE
HPV16 AG SPEC QL: NEGATIVE
HPV18 DNA SPEC QL NAA+PROBE: NEGATIVE

## 2023-07-02 ENCOUNTER — PATIENT MESSAGE (OUTPATIENT)
Dept: OBSTETRICS AND GYNECOLOGY | Facility: CLINIC | Age: 28
End: 2023-07-02
Payer: MEDICAID

## 2023-07-05 DIAGNOSIS — Z30.46 ENCOUNTER FOR REMOVAL AND REINSERTION OF NEXPLANON: Primary | ICD-10-CM

## 2023-07-05 NOTE — TELEPHONE ENCOUNTER
Patient scheduled for hcg labs and nexplanon removal/insertion. Pt verbalized understanding and agreed to appt date, time, and location.

## 2023-07-06 ENCOUNTER — LAB VISIT (OUTPATIENT)
Dept: LAB | Facility: HOSPITAL | Age: 28
End: 2023-07-06
Payer: MEDICAID

## 2023-07-06 DIAGNOSIS — Z30.46 ENCOUNTER FOR REMOVAL AND REINSERTION OF NEXPLANON: ICD-10-CM

## 2023-07-06 LAB — HCG INTACT+B SERPL-ACNC: 6.4 MIU/ML

## 2023-07-06 PROCEDURE — 36415 COLL VENOUS BLD VENIPUNCTURE: CPT

## 2023-07-06 PROCEDURE — 84702 CHORIONIC GONADOTROPIN TEST: CPT

## 2023-07-07 DIAGNOSIS — Z30.017 NEXPLANON INSERTION: Primary | ICD-10-CM

## 2023-07-10 ENCOUNTER — PATIENT MESSAGE (OUTPATIENT)
Dept: OBSTETRICS AND GYNECOLOGY | Facility: CLINIC | Age: 28
End: 2023-07-10
Payer: MEDICAID

## 2023-07-10 ENCOUNTER — LAB VISIT (OUTPATIENT)
Dept: LAB | Facility: HOSPITAL | Age: 28
End: 2023-07-10
Payer: MEDICAID

## 2023-07-10 DIAGNOSIS — Z30.017 NEXPLANON INSERTION: ICD-10-CM

## 2023-07-10 LAB — HCG INTACT+B SERPL-ACNC: <2.4 MIU/ML

## 2023-07-10 PROCEDURE — 36415 COLL VENOUS BLD VENIPUNCTURE: CPT

## 2023-07-10 PROCEDURE — 84702 CHORIONIC GONADOTROPIN TEST: CPT

## 2023-07-11 ENCOUNTER — PATIENT MESSAGE (OUTPATIENT)
Dept: OBSTETRICS AND GYNECOLOGY | Facility: CLINIC | Age: 28
End: 2023-07-11
Payer: MEDICAID

## 2023-07-12 ENCOUNTER — PROCEDURE VISIT (OUTPATIENT)
Dept: OBSTETRICS AND GYNECOLOGY | Facility: CLINIC | Age: 28
End: 2023-07-12
Payer: MEDICAID

## 2023-07-12 VITALS
BODY MASS INDEX: 42.61 KG/M2 | SYSTOLIC BLOOD PRESSURE: 110 MMHG | DIASTOLIC BLOOD PRESSURE: 70 MMHG | WEIGHT: 255.75 LBS | HEIGHT: 65 IN

## 2023-07-12 DIAGNOSIS — Z30.46 ENCOUNTER FOR REMOVAL AND REINSERTION OF NEXPLANON: Primary | ICD-10-CM

## 2023-07-12 PROCEDURE — 11983 REMOVE/INSERT DRUG IMPLANT: CPT | Mod: S$PBB,,,

## 2023-07-12 PROCEDURE — 11983 REMOVAL OF NEXPLANON DEVICE: ICD-10-PCS | Mod: S$PBB,,,

## 2023-07-12 PROCEDURE — 11982 REMOVE DRUG IMPLANT DEVICE: CPT | Mod: PBBFAC

## 2023-07-12 PROCEDURE — 11981 INSERTION DRUG DLVR IMPLANT: CPT | Mod: PBBFAC

## 2023-07-12 PROCEDURE — 11983 REMOVE/INSERT DRUG IMPLANT: CPT | Mod: PBBFAC

## 2023-07-12 RX ADMIN — ETONOGESTREL 68 MG: 68 IMPLANT SUBCUTANEOUS at 11:07

## 2023-07-12 NOTE — PROCEDURES
Insertion of Nexplanon    Date/Time: 7/12/2023 11:00 AM  Performed by: Mi Salazar NP  Authorized by: Mi Salazar NP     Consent obtained:  Written  Consent given by:  Patient  Patient questions answered: yes    Patient agrees, verbalizes understanding, and wants to proceed: yes    Educational handouts given: yes    Instructions and paperwork completed: yes    Pre-procedure timeout performed: yes (HCG 7/11/23- less than 2.4)    Prepped with: alcohol 70% and povidone-iodine    Local anesthetic:  Xylocaine with epinephrine  The site was cleaned and prepped in a sterile fashion: yes    Small stab incision was made in arm: yes    Left/right:  Left   68 mg etonogestreL 68 mg  Preloaded Implanon trocar was placed subdermally: yes    Visualization of implant was obtained: yes    Nexplanon was inserted and trocar removed: yes    Visualization of notch in stilette and palpitation of device: yes    Palpitation confirms placement by provider and patient: yes    Site was closed with steri-strips and pressure bandage applied: yes     POST NEXPLANON INSERTION COUNSELING:  Manage post nexplanon placement arm pain with NSAIDs, Tylenol   Keep arm elevated and apply intermittent ice packs to decrease pain and bruising for 24 Hours.  May remove bandage in 24 hours.  Nexplanon danger signs (worsening pain at insertion site, bleeding through bandage, redness and/or pus drainage at insertion site).  Removal in 3 years.        FOLLOW-UP: 1 Year for [employer] and PRN    Mi Salazar NP

## 2024-05-14 ENCOUNTER — OFFICE VISIT (OUTPATIENT)
Dept: PRIMARY CARE CLINIC | Facility: CLINIC | Age: 29
End: 2024-05-14
Payer: MEDICAID

## 2024-05-14 ENCOUNTER — HOSPITAL ENCOUNTER (OUTPATIENT)
Dept: RADIOLOGY | Facility: HOSPITAL | Age: 29
Discharge: HOME | End: 2024-05-14
Attending: FAMILY MEDICINE
Payer: MEDICAID

## 2024-05-14 VITALS
BODY MASS INDEX: 42.15 KG/M2 | HEART RATE: 66 BPM | SYSTOLIC BLOOD PRESSURE: 126 MMHG | HEIGHT: 65 IN | TEMPERATURE: 98 F | WEIGHT: 253 LBS | DIASTOLIC BLOOD PRESSURE: 84 MMHG | OXYGEN SATURATION: 99 %

## 2024-05-14 DIAGNOSIS — Z11.3 SCREEN FOR STD (SEXUALLY TRANSMITTED DISEASE): ICD-10-CM

## 2024-05-14 DIAGNOSIS — G89.29 CHRONIC LEFT SHOULDER PAIN: ICD-10-CM

## 2024-05-14 DIAGNOSIS — Z00.01 ENCOUNTER FOR GENERAL ADULT MEDICAL EXAMINATION WITH ABNORMAL FINDINGS: Primary | ICD-10-CM

## 2024-05-14 DIAGNOSIS — R00.2 PALPITATIONS: ICD-10-CM

## 2024-05-14 DIAGNOSIS — M25.512 CHRONIC LEFT SHOULDER PAIN: ICD-10-CM

## 2024-05-14 DIAGNOSIS — L30.9 PSORIASIS-ECZEMA OVERLAP CONDITION: ICD-10-CM

## 2024-05-14 DIAGNOSIS — Z13.1 ENCOUNTER FOR SCREENING FOR DIABETES MELLITUS: ICD-10-CM

## 2024-05-14 LAB
OHS QRS DURATION: 74 MS
OHS QTC CALCULATION: 417 MS

## 2024-05-14 PROCEDURE — 99999 PR PBB SHADOW E&M-EST. PATIENT-LVL IV: CPT | Mod: PBBFAC,,, | Performed by: FAMILY MEDICINE

## 2024-05-14 PROCEDURE — 3008F BODY MASS INDEX DOCD: CPT | Mod: CPTII,,, | Performed by: FAMILY MEDICINE

## 2024-05-14 PROCEDURE — 99213 OFFICE O/P EST LOW 20 MIN: CPT | Mod: S$PBB,25,, | Performed by: FAMILY MEDICINE

## 2024-05-14 PROCEDURE — 1159F MED LIST DOCD IN RCRD: CPT | Mod: CPTII,,, | Performed by: FAMILY MEDICINE

## 2024-05-14 PROCEDURE — 93010 ELECTROCARDIOGRAM REPORT: CPT | Mod: S$PBB,,, | Performed by: INTERNAL MEDICINE

## 2024-05-14 PROCEDURE — 99395 PREV VISIT EST AGE 18-39: CPT | Mod: S$PBB,,, | Performed by: FAMILY MEDICINE

## 2024-05-14 PROCEDURE — 73030 X-RAY EXAM OF SHOULDER: CPT | Mod: TC,PN,LT

## 2024-05-14 PROCEDURE — 99214 OFFICE O/P EST MOD 30 MIN: CPT | Mod: PBBFAC,PN,25 | Performed by: FAMILY MEDICINE

## 2024-05-14 PROCEDURE — 3074F SYST BP LT 130 MM HG: CPT | Mod: CPTII,,, | Performed by: FAMILY MEDICINE

## 2024-05-14 PROCEDURE — 73030 X-RAY EXAM OF SHOULDER: CPT | Mod: 26,LT,, | Performed by: RADIOLOGY

## 2024-05-14 PROCEDURE — 93005 ELECTROCARDIOGRAM TRACING: CPT | Mod: PBBFAC,PN | Performed by: INTERNAL MEDICINE

## 2024-05-14 PROCEDURE — 3079F DIAST BP 80-89 MM HG: CPT | Mod: CPTII,,, | Performed by: FAMILY MEDICINE

## 2024-05-14 RX ORDER — CLOBETASOL PROPIONATE 0.5 MG/G
OINTMENT TOPICAL 2 TIMES DAILY
Qty: 60 G | Refills: 11 | Status: SHIPPED | OUTPATIENT
Start: 2024-05-14 | End: 2025-05-14

## 2024-05-14 RX ORDER — MELOXICAM 15 MG/1
15 TABLET ORAL DAILY PRN
Qty: 30 TABLET | Refills: 2 | Status: SHIPPED | OUTPATIENT
Start: 2024-05-14 | End: 2024-08-12

## 2024-05-14 RX ORDER — TIZANIDINE 4 MG/1
4 TABLET ORAL NIGHTLY PRN
Qty: 30 TABLET | Refills: 2 | Status: SHIPPED | OUTPATIENT
Start: 2024-05-14 | End: 2024-08-12

## 2024-05-14 NOTE — PROGRESS NOTES
Subjective:       Patient ID: Roger Juarez is a 29 y.o. female.    Chief Complaint: Other Misc (Left shoulder pain/heart palpitations 1 month ago (not as persistent currently)/ Eczema on right heel foot ), Annual Exam, and Shoulder Pain (rash)      History of Present Illness:   Roger Juarez 29 y.o. female presents today with Other Misc (Left shoulder pain/heart palpitations 1 month ago (not as persistent currently)/ Eczema on right heel foot ), Annual Exam, and Shoulder Pain (rash)  Well Adult Physical: Patient here for a comprehensive physical exam.The patient reports problems - see list.  Reports that her back pain is resolved.  Roger Juarez's allergies, medications, history, and problem list were updated as appropriate.   Past Medical History:   Diagnosis Date    Hand pain, right     Pre-eclampsia, postpartum      Family History   Problem Relation Name Age of Onset    Breast cancer Neg Hx      Colon cancer Neg Hx      Ovarian cancer Neg Hx       Social History     Socioeconomic History    Marital status: Single   Tobacco Use    Smoking status: Never    Smokeless tobacco: Never   Substance and Sexual Activity    Alcohol use: Yes     Comment: occasional    Drug use: No    Sexual activity: Yes     Partners: Male     Birth control/protection: Implant     Comment: nexplanon 6/25/20     Outpatient Encounter Medications as of 5/14/2024   Medication Sig Dispense Refill    [DISCONTINUED] methocarbamoL (ROBAXIN) 500 MG Tab Take 750 mg by mouth 4 (four) times daily.      clobetasol 0.05% (TEMOVATE) 0.05 % Oint Apply topically 2 (two) times daily. 60 g 11    meloxicam (MOBIC) 15 MG tablet Take 1 tablet (15 mg total) by mouth daily as needed for Pain. 30 tablet 2    tiZANidine (ZANAFLEX) 4 MG tablet Take 1 tablet (4 mg total) by mouth nightly as needed (pain). 30 tablet 2    [DISCONTINUED] ibuprofen (ADVIL,MOTRIN) 600 MG tablet Take 600 mg by mouth. (Patient not taking: Reported on  "5/14/2024)       Facility-Administered Encounter Medications as of 5/14/2024   Medication Dose Route Frequency Provider Last Rate Last Admin    etonogestreL subdermal device 68 mg  68 mg Implant  MarkofernandoMi ANGELLAAMANDA Sanchez   68 mg at 07/12/23 1100       Review of Systems   Constitutional:  Negative for chills and fever.   HENT:  Negative for congestion and facial swelling.    Eyes:  Negative for discharge and itching.   Respiratory:  Negative for cough and wheezing.    Cardiovascular:  Positive for palpitations. Negative for chest pain.   Gastrointestinal:  Negative for abdominal pain, nausea and vomiting.   Endocrine: Negative for cold intolerance and heat intolerance.   Genitourinary:  Negative for dysuria and flank pain.   Musculoskeletal:  Positive for arthralgias. Negative for back pain, myalgias and neck stiffness.   Skin:  Negative for pallor and wound.   Neurological:  Negative for facial asymmetry and weakness.   Psychiatric/Behavioral:  Negative for agitation and suicidal ideas.        Objective:      /84 (BP Location: Right arm, Patient Position: Sitting, BP Method: Large (Manual))   Pulse 66   Temp 98.4 °F (36.9 °C)   Ht 5' 5" (1.651 m)   Wt 114.8 kg (253 lb)   LMP 05/12/2024 (Exact Date)   SpO2 99%   BMI 42.10 kg/m²   Physical Exam  Vitals and nursing note reviewed.   Constitutional:       General: She is not in acute distress.     Appearance: She is well-developed.   HENT:      Head: Normocephalic and atraumatic.      Right Ear: External ear normal.      Left Ear: External ear normal.   Eyes:      Conjunctiva/sclera: Conjunctivae normal.   Neck:      Thyroid: No thyromegaly.   Cardiovascular:      Rate and Rhythm: Normal rate and regular rhythm.      Pulses: Normal pulses.      Heart sounds: Normal heart sounds. No murmur heard.  Pulmonary:      Effort: Pulmonary effort is normal. No respiratory distress.      Breath sounds: Normal breath sounds.   Genitourinary:     Comments: " deferred  Musculoskeletal:         General: No deformity.      Right shoulder: Normal.      Left shoulder: Tenderness (lateral neck and top of the shoulder) present. No bony tenderness or crepitus. Normal range of motion. Decreased strength.      Cervical back: Neck supple.      Comments: Shoulder exam:  no impingement sign      Lymphadenopathy:      Head:      Right side of head: No submandibular adenopathy.      Left side of head: No submandibular adenopathy.      Cervical: No cervical adenopathy.   Skin:     General: Skin is warm and dry.   Neurological:      Mental Status: She is alert and oriented to person, place, and time.   Psychiatric:         Behavior: Behavior normal.         Results for orders placed or performed in visit on 07/10/23   HCG, Quantitative   Result Value Ref Range    HCG Quant <2.4 See Text mIU/mL     Assessment:       1. Encounter for general adult medical examination with abnormal findings    2. Chronic left shoulder pain    3. Psoriasis-eczema overlap condition    4. Palpitations    5. Encounter for screening for diabetes mellitus    6. Screen for STD (sexually transmitted disease)        Plan:   1. Encounter for general adult medical examination with abnormal findings  -     Lipid Panel; Future; Expected date: 05/14/2024  -     Comprehensive Metabolic Panel; Future; Expected date: 05/14/2024  -     CBC Auto Differential; Future; Expected date: 05/14/2024    2. Chronic left shoulder pain  Overview:  New complaint  Location: top of left shoulder  Onset: more than 6 mons ago  Provocation:  and works from Mon-Fri and by Thurs, she has to take breaks in between due to worsening pain and fatigue to the left shoulder.  Quality: ache and soreness  Radiation: to the lateral neck  Alleviating: rest  Treatment so far: ibuprofen, naproxen.    No red flags on exam. Pain due to her job required motions leading to overuse syndrome.  Take NSAID Wed to Fri and muscle relaxer at night. PT to  strengthen the muscles and if no improvement, trigger point injection.        Orders:  -     Ambulatory referral/consult to Physical/Occupational Therapy; Future; Expected date: 05/21/2024  -     X-ray Knee Ortho Left; Future; Expected date: 05/14/2024  -     meloxicam (MOBIC) 15 MG tablet; Take 1 tablet (15 mg total) by mouth daily as needed for Pain.  Dispense: 30 tablet; Refill: 2  -     tiZANidine (ZANAFLEX) 4 MG tablet; Take 1 tablet (4 mg total) by mouth nightly as needed (pain).  Dispense: 30 tablet; Refill: 2    3. Psoriasis-eczema overlap condition  Overview:  Flare up to rash on her heels since she ran out of medication, will need refill.    Orders:  -     clobetasol 0.05% (TEMOVATE) 0.05 % Oint; Apply topically 2 (two) times daily.  Dispense: 60 g; Refill: 11    4. Palpitations  Overview:  X 2 episodes in the past 2 mons.   She has underlying anxiety and drinks the strongest coffee and monster drinks on a daily basis. Stressful job for the past 5 mons. She has decreased her coffee intake-2 coffee per day now.    Wean off coffee and other stimulants  Screen with EKG    Orders:  -     EKG 12-lead; Future; Expected date: 05/14/2024  -     Comprehensive Metabolic Panel; Future; Expected date: 05/14/2024  -     CBC Auto Differential; Future; Expected date: 05/14/2024  -     TSH; Future; Expected date: 05/14/2024  -     Toxicology Screen, Urine; Future; Expected date: 05/14/2024    5. Encounter for screening for diabetes mellitus  -     Hemoglobin A1C; Future; Expected date: 05/14/2024    6. Screen for STD (sexually transmitted disease)  -     HIV 1/2 Ag/Ab (4th Gen); Future; Expected date: 05/14/2024  -     C. trachomatis/N. gonorrhoeae by AMP DNA; Future; Expected date: 05/14/2024  -     Hepatitis C Antibody; Future; Expected date: 05/14/2024  -     Hepatitis B Surface Antigen; Future; Expected date: 05/14/2024  -     Treponema Pallidium Antibodies IgG, IgM; Future; Expected date: 05/14/2024        I have  reviewed all of the patient's clinical history available in care everywhere and Epic and have utilized this in my evaluation and management recommendations today.      Treatment options and alternatives were discussed with the patient. Patient was given ample time to ask questions. All questions were answered. Voices understanding and acceptance of this advice. Will call back if any further questions or concerns.     Portions of the record may have been created with voice recognition software. Occasional wrong-word or sound-a-like substitutions may have occurred due to the inherent limitations of voice recognition software. Read the chart carefully and recognize, using context, where substitutions have occurred.               Sade Kelly MD  [facility],

## 2024-05-14 NOTE — PATIENT INSTRUCTIONS
Chronic Pain Discharge Instructions   About this topic   Pain can be an unpleasant feeling that happens in any part of the body. It can be mild or very bad. Pain may come and go or you may feel it all of the time. It may be dull, sharp, or throbbing. When you are in pain, you may not feel hungry. Pain can cause upset stomach and throwing up. You may also feel nervous or have problems sleeping.  Pain is most often a warning that something is wrong. You may have had surgery or an injury. Pain may be from health problems such as migraine or cancer. Acute pain lasts for only a short time and then goes away. Chronic pain lasts for a long time and most often does not go away completely. Chronic pain may disrupt your daily activities. How a doctor treats chronic pain depends on things like the kind of pain, how bad it is, and what is causing the pain.

## 2024-05-15 ENCOUNTER — TELEPHONE (OUTPATIENT)
Dept: PRIMARY CARE CLINIC | Facility: CLINIC | Age: 29
End: 2024-05-15
Payer: MEDICAID

## 2024-05-15 NOTE — PROGRESS NOTES
Your left shoulder xray is normal without any acute findings.  Continue treatment as discussed during your visit and return to clinic in 2 weeks if your symptoms continue.

## 2024-05-20 ENCOUNTER — CLINICAL SUPPORT (OUTPATIENT)
Dept: REHABILITATION | Facility: HOSPITAL | Age: 29
End: 2024-05-20
Attending: FAMILY MEDICINE
Payer: MEDICAID

## 2024-05-20 DIAGNOSIS — R68.89 DECREASED FUNCTIONAL ACTIVITY TOLERANCE: Primary | ICD-10-CM

## 2024-05-20 DIAGNOSIS — R53.1 DECREASED STRENGTH: ICD-10-CM

## 2024-05-20 DIAGNOSIS — M89.9 SCAPULAR DYSFUNCTION: ICD-10-CM

## 2024-05-20 DIAGNOSIS — G89.29 CHRONIC LEFT SHOULDER PAIN: ICD-10-CM

## 2024-05-20 DIAGNOSIS — M25.512 CHRONIC LEFT SHOULDER PAIN: ICD-10-CM

## 2024-05-20 DIAGNOSIS — R52 PAIN: ICD-10-CM

## 2024-05-20 PROCEDURE — 97110 THERAPEUTIC EXERCISES: CPT

## 2024-05-20 PROCEDURE — 97161 PT EVAL LOW COMPLEX 20 MIN: CPT

## 2024-05-20 NOTE — PLAN OF CARE
[facility]   Physical Therapy Initial Evaluation      Date: 5/20/2024   Name: Roger Juarez  Clinic Number: [id number]    Therapy Diagnosis:    Encounter Diagnoses   Name Primary?    Chronic left shoulder pain     Decreased functional activity tolerance Yes    Pain     Scapular dysfunction     Decreased strength       Physician: Sade Kelly MD     Physician Orders: PT Eval and Treat  Medical Diagnosis from Referral: Chronic left shoulder pain  Evaluation Date: 5/20/2024  Authorization Period Expiration: 5/14/2025  Plan of Care Expiration: 7/20/2024  Progress Note Due: 6/20/2024  Visit # / Visits authorized: 1/1  FOTO: 1/3 (last performed on 5/20/2024)      Precautions: Standard    Time In: 4:00 pm  Time Out: 5:00 pm  Total Billable Time (timed & untimed codes): 55 minutes    SUBJECTIVE   Date of onset: August 2023    History of current condition - Roger reports that she has had pain in her left shoulder for almost a year. 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  She notes that her pain gets worse at the end of the week and end of the day.  Indicates pain in her upper trapezial muscle on left side.  No pain on her right side. She also reports that she was having LBP last year - end of 2022/into 2023. She got her bed leveled out in November 2023 and that seemed to help.  She is currently not having any LBP.    Current Activity Level: work and household activities, 4 year old twins, 8 year old son that she cares for. She is going to start trying to exercising again.     Falls: [x] No  [] Yes:     Imaging: [x] Xray [] MRI [] CT:   Normal per patient report.    Prior Therapy:  [] No  [x] Yes: long time ago.  Social History: Patient lives with their family.  Occupation: Patient is .  Prior Level of Function: no pain with work activities in upper trapezial, but was having LBP and was sitting on a stool at  times when back was bothering her more.  Current Level of Function:  No pain with household activities. Only with work.    Pain:  Current 0/10, worst 8/10, best 0/10   Location: left upper trapezial, sometimes left thumb goes numb.  Description: Aching, Throbbing, Grabbing, Tight, Tingling, Numb, Sharp, and Shooting  Aggravating Factors: work activities  Easing Factors: rest arm, OTC anti-inflammatory.    Dominant Extremity:    [x] Right    [] Left    Patients goals: Decrease the pain and be able to work without pain.    Medical History:   Past Medical History:   Diagnosis Date    Hand pain, right     Pre-eclampsia, postpartum        Surgical History:   Roger Juarez  has a past surgical history that includes  section and  section (N/A, 2020).    Medications:   Roger has a current medication list which includes the following prescription(s): clobetasol 0.05%, meloxicam, and tizanidine, and the following Facility-Administered Medications: etonogestrel.    Allergies:   Review of patient's allergies indicates:  No Known Allergies     OBJECTIVE     Posture:  Patient presents with postural abnormalities which include:    [x] Forward Head   [x] Increased Lumbar Lordosis   [x] Rounded Shoulder   [] Flat Back Posture   [x] Increased Thoracic Kyphosis [] Pes Planus   [] Increased Trunk Sway  [] Valgus knee position   [] Increased Trunk Rotation  [] Varus knee position   [] Increased cervical lordosis [] Other: bilateral scapula protracted    Sensation:    Sensation to light touch over UE's is  [x] Intact [] Impaired [] Defer  Sensation to light touch over LE's is  [] Intact [] Impaired [x] Defer    Reflexes:  Patellar    [x] Defer [] Normal [] Hyper []  Hypo   Achilles   [x] Defer [] Normal [] Hyper []  Hypo  Tricep   [x] Defer [] Normal [] Hyper []  Hypo  Brachioradialis [x] Defer [] Normal [] Hyper []  Hypo    Range of Motion:    Shoulder AROM/PROM Right Left Pain/Dysfunction with  Movement   Shoulder Flexion (180º) Full  Full     Shoulder Abduction (180º) Full  Full     Shoulder Extension (60º) Full  Full     Shoulder ER  at 90º (90º) Full  Full      Shoulder IR at 90º (70º) Full  Full     *poor scapular mobility with all shoulder AROM, decreased scapular motion at end range flexion and abduction. Increased scapular substitution with funstion reaching ER/IR.      AROM:  Motion: Movement Results:   Cervical Flexion  chin to chest   Cervical Extension 100%   Cervical Rotation 95%   Upper Extremity IR reach (Medial Rotation) T/L junction   Upper Extremity ER reach (External Rotation) T2   Multi-Segmental Flexion ankles   Multi-Segmental Extension 80%   Multi-Segmental Rotation 90%   Arms Down Deep Squat defer       Strength:    U/E MMT Right Left Pain/Dysfunction with Movement   Cervical Side Bending 4/5 4/5 + trunk shift   Upper trapezial  4/5 4/5    Shoulder Abduction 4/5 4/5    Shoulder IR 4/5 4-/5    Shoulder ER   4/5 4-/5    Serratus Anterior 4/5 3+/5    Supraspinatus  4/5 4-/5    Elbow Flexion  4/5 4-/5    Elbow Extension 4/5 4-/5    Wrist Extension 4/5 4/5    4th/5th Finger Intrinsics 4/5 4/5        Muscle Length:       Muscle Tested  Right Left  Limitation   Upper Trapezius [] Normal      [x] Limited [] Normal      [x] Limited    Levator Scapular  [] Normal      [x] Limited [] Normal      [x] Limited    Pectoralis Minor [] Normal      [x] Limited [] Normal      [x] Limited    Pectoralis Major [] Normal      [x] Limited [] Normal      [x] Limited        Joint Mobility:   JOINT MOBILITY CHARTS:   Joint Motion Right Mobility  (spine) Left Mobility Goal   1st Rib  [x] Hypo     [] Normal     [] Hyper [x] Hypo     [] Normal     [] Hyper Normal         Special Tests:  defer    Palpation:  Patient tender with increased tone over bilateral upper trapezial, levator scapula, latissimus dorsi, teres minor, infraspinatus, subscapularis, anterior chest muscle's, posterior cervical muscle's,  sternocleidomastoid, suboccipital muscle, medial/lateral scapular muscle's.      FOTO:    Intake Outcome Measure for FOTO shoulder Survey    Therapist reviewed FOTO scores for Roger Juarez on 5/20/2024.   FOTO documents entered into [employer] - see Media section or FOTO account episode details..    Intake Score: see below             TREATMENT     Total Treatment time (time-based codes) separate from Evaluation: (16) minutes   Billing reflects [place] medicaid guidelines, billing all therapy as therapeutic-exercise      Roger received the treatments listed below:   Roger received therapeutic exercises to develop strength, endurance, ROM, flexibility, and core stabilization for 5 minutes including:    Intervention 5/20/2024  Parameters   HEP x                                   and   Roger received the following manual therapy techniques: Joint mobilizations, Myofacial release, and Soft tissue Mobilization were applied to the: bilateral upper quadrant for 8 minutes, including:    Manual Intervention 5/20/2024     Soft Tissue Mobilization x bilateral suboccipitals, upper trapezius, levator scapulae , periscapular musculature, subscapularis     Joint Mobilizations x 1st rib mobility bilaterally   Mobilization with movement x Scapular tilts        Functional Dry Needling             Plan for Next Visit: see flowsheet        PATIENT EDUCATION AND HOME EXERCISES     Education provided: (during treatment session) minutes  Home exercise program, and importance of exercise and posture/positioning with clients - try and get them lower and closer to her upper extremities' so she does not have to reach as much.    Written Home Exercises Provided: yes.  Exercises were reviewed and Roger was able to demonstrate them prior to the end of the session.  Roger demonstrated good  understanding of the education provided. See EMR under Patient Instructions for exercises provided during therapy  sessions.    ASSESSMENT     Roger is a 29 y.o. female referred to outpatient Physical Therapy with a medical diagnosis of Chronic left shoulder pain. The patient presents with signs and symptoms consistent with diagnosis along with impairments which include weakness, impaired endurance, decreased upper extremity function, pain, abnormal tone, impaired joint extensibility, and impaired muscle length.      Patient prognosis is Good, if patient is consistent and compliant with Physical Therapy and home exercise program.  Patient will benefit from skilled outpatient Physical Therapy to address the deficits stated above and in the chart below, provide patient/family education, and to maximize patient's level of independence.     Plan of care discussed with patient: Yes  Patient's spiritual, cultural and educational needs considered and patient is agreeable to the plan of care and goals as stated below:     Anticipated Barriers for therapy: co-morbidities, sedentary lifestyle, chronicity of condition, lack of support system, and coping style      Medical Necessity is demonstrated by the following   History  Co-morbidities and personal factors that may impact the plan of care [x] LOW: no personal factors / co-morbidities  [] MODERATE: 1-2 personal factors / co-morbidities  [] HIGH: 3+ personal factors / co-morbidities    Moderate / High Support Documentation:   Past Medical History:   Diagnosis Date    Hand pain, right     Pre-eclampsia, postpartum          Examination  Body Structures and Functions, activity limitations and participation restrictions that may impact the plan of care [] LOW: addressing 1-2 elements  [x] MODERATE: 3+ elements  [] HIGH: 4+ elements (please support below)    Moderate / High Support Documentation: See evaluation / objective measurements above and FOTO.     Clinical Presentation [] LOW: stable  [x] MODERATE: Evolving  [] HIGH: Unstable     Decision Making/ Complexity Score: moderate          Goals:  Reviewed: 5/20/2024      Short Term Goals: In 4 weeks  Progress Date   1.Patient to be educated on HEP. [x] Progressing  [] MET   [] Not MET   [] Not tested    2.Patient to increase bilateral UE strength by 1/2 grade, in order to improve endurance and increase ability to perform all functional activities for increased time.  [x] Progressing  [] MET   [] Not MET  [] Not tested    3.Patient to have pain less than 5/10 at worst, to improve QOL. [x] Progressing  [] MET   [] Not MET  [] Not tested    4.Patient to improve score on the FOTO, to improve QOL. [x] Progressing  [] MET   [] Not MET  [] Not tested      Long Term Goals: In 8 weeks Progress Date   1.Patient to improve score on the FOTO predicted score or better, to improve QOL. [x] Progressing  [] MET   [] Not MET  [] Not tested    2. Patient to increase bilateral UE strength to 5/5 or greater, in order to improve endurance and increase ability to perform all functional activities for increased time. [x] Progressing  [] MET   [] Not MET  [] Not tested    3. Patient to have decreased pain to 2/10 at worst, to improve QOL. [x] Progressing  [] MET   [] Not MET  [] Not tested    4. Patient to perform daily activities including work activities with only mild increased symptoms. [x] Progressing  [] MET   [] Not MET  [] Not tested      PLAN     Plan of care Certification: 5/20/2024  to 7/20/2024.    Outpatient Physical Therapy 1-2 times weekly for 8 weeks to include any combination of the following interventions: electrical stimulation (PRN), Manual Therapy, Neuromuscular Re-ed, Patient Education/Self Care, Therapeutic Activites, Therapeutic Exercise, Dry Needling, and Moist Hot Pack/Cold Pack    Trudy Garrison, PT

## 2024-05-21 PROBLEM — M89.9 SCAPULAR DYSFUNCTION: Status: ACTIVE | Noted: 2024-05-21

## 2024-05-21 PROBLEM — R68.89 DECREASED FUNCTIONAL ACTIVITY TOLERANCE: Status: ACTIVE | Noted: 2024-05-21

## 2024-05-21 PROBLEM — R53.1 DECREASED STRENGTH: Status: ACTIVE | Noted: 2024-05-21

## 2024-05-21 PROBLEM — R52 PAIN: Status: ACTIVE | Noted: 2024-05-21

## 2024-05-29 ENCOUNTER — CLINICAL SUPPORT (OUTPATIENT)
Dept: REHABILITATION | Facility: HOSPITAL | Age: 29
End: 2024-05-29
Payer: MEDICAID

## 2024-05-29 DIAGNOSIS — R53.1 DECREASED STRENGTH: ICD-10-CM

## 2024-05-29 DIAGNOSIS — R52 PAIN: ICD-10-CM

## 2024-05-29 DIAGNOSIS — R68.89 DECREASED FUNCTIONAL ACTIVITY TOLERANCE: Primary | ICD-10-CM

## 2024-05-29 DIAGNOSIS — M89.9 SCAPULAR DYSFUNCTION: ICD-10-CM

## 2024-05-29 PROCEDURE — 97110 THERAPEUTIC EXERCISES: CPT

## 2024-05-29 NOTE — PROGRESS NOTES
[facility]   Physical Therapy Treatment Note        Name: Roger Juarez  Clinic Number: [id number]    Therapy Diagnosis:   Encounter Diagnoses   Name Primary?    Decreased functional activity tolerance Yes    Pain     Scapular dysfunction     Decreased strength      Physician: Sade Kelly MD    Visit Date: 5/29/2024    Physician Orders: PT Eval and Treat  Medical Diagnosis from Referral: Chronic left shoulder pain  Evaluation Date: 5/20/2024  Authorization Period Expiration: 12/31/2024  Plan of Care Expiration: 7/20/2024  Progress Note Due: 6/20/2024  Visit # / Visits authorized: 2/10 (+1)  FOTO: 1/3 (last performed on 5/20/2024)       Precautions: Standard    Time In: 9:00 am   Time Out: 9:55 am   Total Billable Time: 53 minutes  Billing reflects one on one time spent with patient. Billing reflects [place] medicaid guidelines, billing all therapy as therapeutic-exercise      SUBJECTIVE     Pt reports: Did feel a little better after evaluation. Has mainly been doing exercises with band. Had a really tall client and was unable to position herself in a good position and did have more pain after that.    She was compliant with home exercise program.  Response to previous treatment: improved symptoms  Functional change: none reported    Pain: NT/10  Location:  left upper trapezial, sometimes left thumb goes numb.     OBJECTIVE     Objective Measures updated at progress report unless specified.       TREATMENT       CPT Intervention Duration / Intensity  5/29/2024    MT  bilateral upper trapezial, Levator scapula, rib and thoracic mobilty, extensive at upper trapezial, first rib mobility   15   TE  UBE  3/3      shoulder rolls bw  10x/ 2 sets      bilateral shoulder ER  -   NMR  series 6 on 1/2 roll (try airex next visit)  Pectoral stretch  Swimmers  Hugs  Shoulder rolls  Retraction/protraction  angels    3 min  12x  12x  12x  12x  12x      prone I, T, Y  10x/ 2 sets each       stand rows green  10x/ 2 sets    Stand lat pulls 10x/ 2 sets    Paloff press  -    Quadruped reach back  5x/ 2 set   TA                                                              PLAN  progress as able           CPT Codes available for Billing:   (15) minutes of Manual therapy (MT) to improve pain and ROM.  (08) minutes of Therapeutic Exercise (TE) to develop strength, endurance, range of motion, and flexibility.  (30) minutes of Neuromuscular Re-Education (NMR)  to improve: Balance, Coordination, Kinesthetic, Sense, Proprioception, and Posture.  (00) minutes of Therapeutic Activities (TA) to improve functional performance.  Unattended Electrical Stimulation (ES) for muscle performance or pain modulation.  BFR: Blood flow restriction applied during exercise  NP or (-): Not Performed              PATIENT EDUCATION AND HOME EXERCISES     Home Exercises Provided and Patient Education Provided     Education provided:   - posture/strength/positioning with work activities    Written Home Exercises Provided: Patient instructed to cont prior HEP. Exercises were reviewed and Roger was able to demonstrate them prior to the end of the session.  Roger demonstrated good  understanding of the education provided. See EMR under Patient Instructions for exercises provided during therapy sessions      ASSESSMENT     Patient tolerated all treatment well and was able to complete all interventions as noted. She did require cues for all interventions for correct technique and to avoid scapular substitution. Encouraged home exercise program.    Roger Is progressing well towards her goals.   Pt prognosis is Good.     Pt will continue to benefit from skilled outpatient physical therapy to address the deficits listed in the problem list box on initial evaluation, provide pt/family education and to maximize pt's level of independence in the home and community environment.     Pt's spiritual, cultural and educational needs  considered and pt agreeable to plan of care and goals.     Anticipated barriers to physical therapy:  co-morbidities, sedentary lifestyle, chronicity of condition, lack of support system, and coping style     Goals:   Reviewed: 5/29/2024       Short Term Goals: In 4 weeks  Progress Date   1.Patient to be educated on HEP. [x] Progressing  [] MET   [] Not MET   [] Not tested     2.Patient to increase bilateral UE strength by 1/2 grade, in order to improve endurance and increase ability to perform all functional activities for increased time.  [x] Progressing  [] MET   [] Not MET  [] Not tested     3.Patient to have pain less than 5/10 at worst, to improve QOL. [x] Progressing  [] MET   [] Not MET  [] Not tested     4.Patient to improve score on the FOTO, to improve QOL. [x] Progressing  [] MET   [] Not MET  [] Not tested        Long Term Goals: In 8 weeks Progress Date   1.Patient to improve score on the FOTO predicted score or better, to improve QOL. [x] Progressing  [] MET   [] Not MET  [] Not tested     2. Patient to increase bilateral UE strength to 5/5 or greater, in order to improve endurance and increase ability to perform all functional activities for increased time. [x] Progressing  [] MET   [] Not MET  [] Not tested     3. Patient to have decreased pain to 2/10 at worst, to improve QOL. [x] Progressing  [] MET   [] Not MET  [] Not tested     4. Patient to perform daily activities including work activities with only mild increased symptoms. [x] Progressing  [] MET   [] Not MET  [] Not tested          PLAN     Monitor response to today's treatment session and progress with Physical Therapy plan of care as indicated.    Plan of care Certification: 5/20/2024  to 7/20/2024.     Outpatient Physical Therapy 1-2 times weekly for 8 weeks to include any combination of the following interventions: electrical stimulation (PRN), Manual Therapy, Neuromuscular Re-ed, Patient Education/Self Care, Therapeutic Activites,  Therapeutic Exercise, Dry Needling, and Moist Hot Pack/Cold Pack    Trudy Garrison, PT

## 2024-06-10 ENCOUNTER — CLINICAL SUPPORT (OUTPATIENT)
Dept: REHABILITATION | Facility: HOSPITAL | Age: 29
End: 2024-06-10
Payer: MEDICAID

## 2024-06-10 DIAGNOSIS — M89.9 SCAPULAR DYSFUNCTION: ICD-10-CM

## 2024-06-10 DIAGNOSIS — R52 PAIN: ICD-10-CM

## 2024-06-10 DIAGNOSIS — R68.89 DECREASED FUNCTIONAL ACTIVITY TOLERANCE: Primary | ICD-10-CM

## 2024-06-10 DIAGNOSIS — R53.1 DECREASED STRENGTH: ICD-10-CM

## 2024-06-10 PROCEDURE — 97110 THERAPEUTIC EXERCISES: CPT

## 2024-06-10 NOTE — PROGRESS NOTES
[facility]   Physical Therapy Treatment Note        Name: Roger Juarez  Clinic Number: [id number]    Therapy Diagnosis:   Encounter Diagnoses   Name Primary?    Decreased functional activity tolerance Yes    Pain     Scapular dysfunction     Decreased strength      Physician: Sade Kelly MD    Visit Date: 6/10/2024    Physician Orders: PT Eval and Treat  Medical Diagnosis from Referral: Chronic left shoulder pain  Evaluation Date: 5/20/2024  Authorization Period Expiration: 12/31/2024  Plan of Care Expiration: 7/20/2024  Progress Note Due: 6/20/2024  Visit # / Visits authorized: 2/10 (+1)  FOTO: 1/3 (last performed on 5/20/2024)       Precautions: Standard    Time In: 9:00 am   Time Out: 9:55 am   Total Billable Time: 53 minutes  Billing reflects one on one time spent with patient. Billing reflects [place] medicaid guidelines, billing all therapy as therapeutic-exercise      SUBJECTIVE     Pt reports: Does still have pain at the end of the day/week, but has been doing exercises and has also started to do some cardio exercises on her own. Discussed FDN as possible treatment option and patient considering.    She was compliant with home exercise program.  Response to previous treatment: improved symptoms  Functional change: none reported    Pain: NT/10  Location:  left upper trapezial, sometimes left thumb goes numb.     OBJECTIVE     Objective Measures updated at progress report unless specified.       TREATMENT       CPT Intervention Duration / Intensity  6/10/2024    MT  bilateral upper trapezial, Levator scapula, rib and thoracic mobilty, extensive at upper trapezial, first rib mobility   15   TE  UBE  3/3    TE  shoulder rolls bw  10x/ 2 sets    TE  bilateral shoulder ER  red   10x/ 3 sets   NMR  series 6  airex   Pectoral stretch  Swimmers  Hugs  Shoulder rolls  Retraction/protraction  angels  -  3 min  12x  12x  12x  12x  12x   NMR  prone I, T, Y  bilaterally    10x/ 2 sets each   NMR Quadruped reach back  5x/ 2 set   TA stand rows green  10x/ 2 sets    TA  Stand lat pulls green  10x/ 2 sets    TA  Paloff press 7.5# at weighted pulleys  10x/ 2 sets                                           PLAN  progress as able           CPT Codes available for Billing:   (15) minutes of Manual therapy (MT) to improve pain and ROM.  (12) minutes of Therapeutic Exercise (TE) to develop strength, endurance, range of motion, and flexibility.  (18) minutes of Neuromuscular Re-Education (NMR)  to improve: Balance, Coordination, Kinesthetic, Sense, Proprioception, and Posture.  (08) minutes of Therapeutic Activities (TA) to improve functional performance.  Unattended Electrical Stimulation (ES) for muscle performance or pain modulation.  BFR: Blood flow restriction applied during exercise  NP or (-): Not Performed              PATIENT EDUCATION AND HOME EXERCISES     Home Exercises Provided and Patient Education Provided     Education provided:   - posture/strength/positioning with work activities    Written Home Exercises Provided: Patient instructed to cont prior HEP. Exercises were reviewed and Roger was able to demonstrate them prior to the end of the session.  Roger demonstrated good  understanding of the education provided. See EMR under Patient Instructions for exercises provided during therapy sessions      ASSESSMENT     Patient completed all interventions as noted with cues for technique and to avoid upper trapezial substitution. Encouraged home exercise program and continued discussion on work activities - rest breaks, position of clients.    Roger Is progressing well towards her goals.   Pt prognosis is Good.     Pt will continue to benefit from skilled outpatient physical therapy to address the deficits listed in the problem list box on initial evaluation, provide pt/family education and to maximize pt's level of independence in the home and community environment.     Pt's  spiritual, cultural and educational needs considered and pt agreeable to plan of care and goals.     Anticipated barriers to physical therapy:  co-morbidities, sedentary lifestyle, chronicity of condition, lack of support system, and coping style     Goals:   Reviewed: 6/10/2024       Short Term Goals: In 4 weeks  Progress Date   1.Patient to be educated on HEP. [x] Progressing  [] MET   [] Not MET   [] Not tested     2.Patient to increase bilateral UE strength by 1/2 grade, in order to improve endurance and increase ability to perform all functional activities for increased time.  [x] Progressing  [] MET   [] Not MET  [] Not tested     3.Patient to have pain less than 5/10 at worst, to improve QOL. [x] Progressing  [] MET   [] Not MET  [] Not tested     4.Patient to improve score on the FOTO, to improve QOL. [x] Progressing  [] MET   [] Not MET  [] Not tested        Long Term Goals: In 8 weeks Progress Date   1.Patient to improve score on the FOTO predicted score or better, to improve QOL. [x] Progressing  [] MET   [] Not MET  [] Not tested     2. Patient to increase bilateral UE strength to 5/5 or greater, in order to improve endurance and increase ability to perform all functional activities for increased time. [x] Progressing  [] MET   [] Not MET  [] Not tested     3. Patient to have decreased pain to 2/10 at worst, to improve QOL. [x] Progressing  [] MET   [] Not MET  [] Not tested     4. Patient to perform daily activities including work activities with only mild increased symptoms. [x] Progressing  [] MET   [] Not MET  [] Not tested          PLAN     Monitor response to today's treatment session and progress with Physical Therapy plan of care as indicated.    Plan of care Certification: 5/20/2024  to 7/20/2024.     Outpatient Physical Therapy 1-2 times weekly for 8 weeks to include any combination of the following interventions: electrical stimulation (PRN), Manual Therapy, Neuromuscular Re-ed, Patient  Education/Self Care, Therapeutic Activites, Therapeutic Exercise, Dry Needling, and Moist Hot Pack/Cold Pack    Trudy Garrison, PT

## 2024-06-18 ENCOUNTER — CLINICAL SUPPORT (OUTPATIENT)
Dept: REHABILITATION | Facility: HOSPITAL | Age: 29
End: 2024-06-18
Payer: MEDICAID

## 2024-06-18 DIAGNOSIS — M89.9 SCAPULAR DYSFUNCTION: ICD-10-CM

## 2024-06-18 DIAGNOSIS — R52 PAIN: ICD-10-CM

## 2024-06-18 DIAGNOSIS — R68.89 DECREASED FUNCTIONAL ACTIVITY TOLERANCE: Primary | ICD-10-CM

## 2024-06-18 DIAGNOSIS — R53.1 DECREASED STRENGTH: ICD-10-CM

## 2024-06-18 PROCEDURE — 97110 THERAPEUTIC EXERCISES: CPT

## 2024-06-18 NOTE — PROGRESS NOTES
[facility]   Physical Therapy Treatment Note        Name: Roger Juarez  Clinic Number: [id number]    Therapy Diagnosis:   Encounter Diagnoses   Name Primary?    Decreased functional activity tolerance Yes    Pain     Scapular dysfunction     Decreased strength      Physician: Sade Kelly MD    Visit Date: 6/18/2024    Physician Orders: PT Eval and Treat  Medical Diagnosis from Referral: Chronic left shoulder pain  Evaluation Date: 5/20/2024  Authorization Period Expiration: 12/31/2024  Plan of Care Expiration: 7/20/2024  Progress Note Due: 6/20/2024  Visit # / Visits authorized: 3/10 (+1)  FOTO: 1/3 (last performed on 5/20/2024)       Precautions: Standard    Time In: 8:30 am   Time Out: 9:30 am   Total Billable Time: 55 minutes  Billing reflects one on one time spent with patient. Billing reflects [place] medicaid guidelines, billing all therapy as therapeutic-exercise      SUBJECTIVE     Pt reports: Did better this past week and is not having any pain this morning. She reports some discomfort with manual and increased arm numbness with pectoral releases. Has been exercising - doing trap cardio and it is going well.    She was compliant with home exercise program.  Response to previous treatment: improved symptoms  Functional change: none reported    Pain: NT/10  Location:  left upper trapezial, sometimes left thumb goes numb.     OBJECTIVE     Objective Measures updated at progress report unless specified.       TREATMENT        CPT Intervention Duration / Intensity  6/10/2024    MT  bilateral upper trapezial, Levator scapula, pectorals, subscapularis and latissimus dorsi, z, first rib mobility   15   TE  UBE  3'/3'    TE  shoulder rolls bw  10x/ 2 sets   TE Doorway stretch 30 sec/ 3x    TE  bilateral shoulder ER red   -   NMR  series 6 airex   Pectoral stretch  Swimmers  Hugs  Shoulder rolls  Retraction/protraction  angels  -  3 min  12x  12x  12x  12x  12x   NMR   prone I, T, Y bilaterally (add weight next visit)  10x/ 2 sets each   NMR Quadruped reach back (add resistance next visit) 5x/ 2 set   TA stand rows #5  10x/ 2 sets    TA  Stand lat pulls #5   10x/ 2 sets    TA  Paloff press 10# at weighted pulleys  10x/ 2 sets   PLAN  progress as able        CPT Codes available for Billing:   (15) minutes of Manual therapy (MT) to improve pain and ROM.  (10) minutes of Therapeutic Exercise (TE) to develop strength, endurance, range of motion, and flexibility.  (20) minutes of Neuromuscular Re-Education (NMR)  to improve: Balance, Coordination, Kinesthetic, Sense, Proprioception, and Posture.  (10) minutes of Therapeutic Activities (TA) to improve functional performance.  Unattended Electrical Stimulation (ES) for muscle performance or pain modulation.  BFR: Blood flow restriction applied during exercise  NP or (-): Not Performed              PATIENT EDUCATION AND HOME EXERCISES     Home Exercises Provided and Patient Education Provided     Education provided:   - posture/strength/positioning with work activities    Written Home Exercises Provided: Patient instructed to cont prior HEP. Exercises were reviewed and Roger was able to demonstrate them prior to the end of the session.  Roger demonstrated good  understanding of the education provided. See EMR under Patient Instructions for exercises provided during therapy sessions      ASSESSMENT     Patient was able to complete all interventions as noted and was able to progress as noted. Cues during treatment session for correct technique and to avoid substitution patterns. Encouraged home exercise program and continued cardio exercise.    Roger Is progressing well towards her goals.   Pt prognosis is Good.     Pt will continue to benefit from skilled outpatient physical therapy to address the deficits listed in the problem list box on initial evaluation, provide pt/family education and to maximize pt's level of independence  in the home and community environment.     Pt's spiritual, cultural and educational needs considered and pt agreeable to plan of care and goals.     Anticipated barriers to physical therapy:  co-morbidities, sedentary lifestyle, chronicity of condition, lack of support system, and coping style     Goals:   Reviewed: 6/18/2024       Short Term Goals: In 4 weeks  Progress Date   1.Patient to be educated on HEP. [x] Progressing  [] MET   [] Not MET   [] Not tested     2.Patient to increase bilateral UE strength by 1/2 grade, in order to improve endurance and increase ability to perform all functional activities for increased time.  [x] Progressing  [] MET   [] Not MET  [] Not tested     3.Patient to have pain less than 5/10 at worst, to improve QOL. [x] Progressing  [] MET   [] Not MET  [] Not tested     4.Patient to improve score on the FOTO, to improve QOL. [x] Progressing  [] MET   [] Not MET  [] Not tested        Long Term Goals: In 8 weeks Progress Date   1.Patient to improve score on the FOTO predicted score or better, to improve QOL. [x] Progressing  [] MET   [] Not MET  [] Not tested     2. Patient to increase bilateral UE strength to 5/5 or greater, in order to improve endurance and increase ability to perform all functional activities for increased time. [x] Progressing  [] MET   [] Not MET  [] Not tested     3. Patient to have decreased pain to 2/10 at worst, to improve QOL. [x] Progressing  [] MET   [] Not MET  [] Not tested     4. Patient to perform daily activities including work activities with only mild increased symptoms. [x] Progressing  [] MET   [] Not MET  [] Not tested          PLAN     Monitor response to today's treatment session and progress with Physical Therapy plan of care as indicated.    Plan of care Certification: 5/20/2024  to 7/20/2024.     Outpatient Physical Therapy 1-2 times weekly for 8 weeks to include any combination of the following interventions: electrical stimulation (PRN),  Manual Therapy, Neuromuscular Re-ed, Patient Education/Self Care, Therapeutic Activites, Therapeutic Exercise, Dry Needling, and Moist Hot Pack/Cold Pack    Trudy Garrison, PT

## 2024-06-24 ENCOUNTER — CLINICAL SUPPORT (OUTPATIENT)
Dept: REHABILITATION | Facility: HOSPITAL | Age: 29
End: 2024-06-24
Payer: MEDICAID

## 2024-06-24 DIAGNOSIS — R53.1 DECREASED STRENGTH: ICD-10-CM

## 2024-06-24 DIAGNOSIS — M89.9 SCAPULAR DYSFUNCTION: ICD-10-CM

## 2024-06-24 DIAGNOSIS — R52 PAIN: ICD-10-CM

## 2024-06-24 DIAGNOSIS — R68.89 DECREASED FUNCTIONAL ACTIVITY TOLERANCE: Primary | ICD-10-CM

## 2024-06-24 PROCEDURE — 97110 THERAPEUTIC EXERCISES: CPT

## 2024-06-24 NOTE — PROGRESS NOTES
[facility]   Physical Therapy Treatment Note        Name: Roger Juarez  Clinic Number: [id number]    Therapy Diagnosis:   Encounter Diagnoses   Name Primary?    Decreased functional activity tolerance Yes    Pain     Scapular dysfunction     Decreased strength      Physician: Sade Kelly MD    Visit Date: 6/24/2024    Physician Orders: PT Eval and Treat  Medical Diagnosis from Referral: Chronic left shoulder pain  Evaluation Date: 5/20/2024  Authorization Period Expiration: 12/31/2024  Plan of Care Expiration: 7/20/2024  Progress Note Due: 7/20/2024  Visit # / Visits authorized: 4/10 (+1)  FOTO: 1/3 (last performed on 6/24/2024)       Precautions: Standard    Time In: 9:05 am   Time Out: 10:00 am   Total Billable Time: 55 minutes  Billing reflects one on one time spent with patient. Billing reflects [place] medicaid guidelines, billing all therapy as therapeutic-exercise      SUBJECTIVE     Pt reports: Still having some pain mostly when has taller patient's and is in a bad position. Pain has been 8/10 at worst. But only while working. Has had to take less medication.    She was compliant with home exercise program.  Response to previous treatment: improved symptoms  Functional change: taking less medication.    Pain: 0/10  Location:  left upper trapezial, sometimes left thumb goes numb.     OBJECTIVE     Objective Measures updated at progress report unless specified.     Strength:     U/E MMT Right Left Pain/Dysfunction with Movement 6/24/24   Cervical Side Bending 4/5 4/5 + trunk shift 4+/5   Upper trapezial  4/5 4/5   4+/5   Shoulder Abduction 4/5 4/5   4+/5   Shoulder IR 4/5 4-/5   4+/5   Shoulder ER    4/5 4-/5   4+/5   Serratus Anterior 4/5 3+/5   4+/5   Supraspinatus  4/5 4-/5   4+/5   Elbow Flexion  4/5 4-/5   4+/5   Elbow Extension 4/5 4-/5   4+/5   Wrist Extension 4/5 4/5   4+/5   4th/5th Finger Intrinsics 4/5 4/5   4+/5         Muscle Length:         Muscle  Tested  Right Left  6/24/24   Upper Trapezius [] Normal      [x] Limited [] Normal      [x] Limited [] Normal      [x] Limited    Levator Scapular  [] Normal      [x] Limited [] Normal      [x] Limited [] Normal      [x] Limited    Pectoralis Minor [] Normal      [x] Limited [] Normal      [x] Limited [] Normal      [x] Limited    Pectoralis Major [] Normal      [x] Limited [] Normal      [x] Limited  [x] Normal      [] Limited        Joint Mobility:   JOINT MOBILITY CHARTS:   Joint Motion Right Mobility  (spine) Left Mobility Goal 6/24/24   1st Rib  [x] Hypo     [] Normal     [] Hyper [x] Hypo     [] Normal     [] Hyper Normal        FOTO:      TREATMENT        CPT Intervention Duration / Intensity  6/10/2024    MT  bilateral upper trapezial, Levator scapula, pectorals, subscapularis and latissimus dorsi, z, first rib mobility   15   TE  UBE  3'/3'    TE  shoulder rolls bw  10x/ 2 sets   TE Doorway stretch 30 sec/ 3x    TE  bilateral shoulder ER red   10x/2 sets   TE FOTO/re-assess x   NMR  series 6 airex   Pectoral stretch  Swimmers  Hugs  Shoulder rolls  Retraction/protraction  angels  -  3 min  12x  12x  12x  12x  12x   NMR  prone I, T, Y bilaterally (add weight next visit)  10x/ 2 sets each   NMR Quadruped reach back (add resistance next visit) 5x/ 2 set   TA stand rows #5  10x/ 2 sets    TA  Stand lat pulls #5   10x/ 2 sets    TA  Paloff press 10# at weighted pulleys  10x/ 2 sets   PLAN  progress as able        CPT Codes available for Billing:   (15) minutes of Manual therapy (MT) to improve pain and ROM.  (10) minutes of Therapeutic Exercise (TE) to develop strength, endurance, range of motion, and flexibility.  (20) minutes of Neuromuscular Re-Education (NMR)  to improve: Balance, Coordination, Kinesthetic, Sense, Proprioception, and Posture.  (10) minutes of Therapeutic Activities (TA) to improve functional performance.  Unattended Electrical Stimulation (ES) for muscle performance or pain modulation.  BFR:  Blood flow restriction applied during exercise  NP or (-): Not Performed              PATIENT EDUCATION AND HOME EXERCISES     Home Exercises Provided and Patient Education Provided     Education provided:   - posture/strength/positioning with work activities    Written Home Exercises Provided: Patient instructed to cont prior HEP. Exercises were reviewed and Roger was able to demonstrate them prior to the end of the session.  Roger demonstrated good  understanding of the education provided. See EMR under Patient Instructions for exercises provided during therapy sessions      ASSESSMENT     Patient was able to complete all interventions as noted and was able to progress as noted. Cues during treatment session for correct technique and to avoid substitution patterns. Encouraged home exercise program and continued cardio exercise.    Roger Is progressing well towards her goals.   Pt prognosis is Good.     Pt will continue to benefit from skilled outpatient physical therapy to address the deficits listed in the problem list box on initial evaluation, provide pt/family education and to maximize pt's level of independence in the home and community environment.     Pt's spiritual, cultural and educational needs considered and pt agreeable to plan of care and goals.     Anticipated barriers to physical therapy:  co-morbidities, sedentary lifestyle, chronicity of condition, lack of support system, and coping style     Goals:   Reviewed: 6/24/2024       Short Term Goals: In 4 weeks  Progress Date   1.Patient to be educated on HEP. [x] Progressing  [] MET   [] Not MET   [] Not tested     2.Patient to increase bilateral UE strength by 1/2 grade, in order to improve endurance and increase ability to perform all functional activities for increased time.  [x] Progressing  [] MET   [] Not MET  [] Not tested     3.Patient to have pain less than 5/10 at worst, to improve QOL. [x] Progressing  [] MET   [] Not MET  [] Not  tested     4.Patient to improve score on the FOTO, to improve QOL. [x] Progressing  [] MET   [] Not MET  [] Not tested        Long Term Goals: In 8 weeks Progress Date   1.Patient to improve score on the FOTO predicted score or better, to improve QOL. [x] Progressing  [] MET   [] Not MET  [] Not tested     2. Patient to increase bilateral UE strength to 5/5 or greater, in order to improve endurance and increase ability to perform all functional activities for increased time. [x] Progressing  [] MET   [] Not MET  [] Not tested     3. Patient to have decreased pain to 2/10 at worst, to improve QOL. [x] Progressing  [] MET   [] Not MET  [] Not tested     4. Patient to perform daily activities including work activities with only mild increased symptoms. [x] Progressing  [] MET   [] Not MET  [] Not tested          PLAN     Monitor response to today's treatment session and progress with Physical Therapy plan of care as indicated.    Plan of care Certification: 5/20/2024  to 7/20/2024.     Outpatient Physical Therapy 1-2 times weekly for 8 weeks to include any combination of the following interventions: electrical stimulation (PRN), Manual Therapy, Neuromuscular Re-ed, Patient Education/Self Care, Therapeutic Activites, Therapeutic Exercise, Dry Needling, and Moist Hot Pack/Cold Pack    Trudy Garrison, UBALDO

## 2024-06-24 NOTE — PROGRESS NOTES
[facility]   Physical Therapy Treatment Note        Name: Roger Juarez  Clinic Number: [id number]    Therapy Diagnosis:   Encounter Diagnoses   Name Primary?    Decreased functional activity tolerance Yes    Pain     Scapular dysfunction     Decreased strength      Physician: Sade Kelly MD    Visit Date: 6/24/2024    Physician Orders: PT Eval and Treat  Medical Diagnosis from Referral: Chronic left shoulder pain  Evaluation Date: 5/20/2024  Authorization Period Expiration: 12/31/2024  Plan of Care Expiration: 7/20/2024  Progress Note Due: 7/20/2024  Visit # / Visits authorized: 4/10 (+1)  FOTO: 2/3 (last performed on 6/24/2024)       Precautions: Standard    Time In: 9:05 am   Time Out: 10:10 am   Total Billable Time: 65 minutes  Billing reflects one on one time spent with patient. Billing reflects [place] medicaid guidelines, billing all therapy as therapeutic-exercise      SUBJECTIVE     Pt reports: Still having some pain mostly when has taller patient's and is in a bad position. Pain has been 8/10 at worst. But only while working. Has had to take less medication.  Feels like aggravated with work activities but then resolves mostly when not working. Discussed FDN and patient issued consent form for her to review and consider.    She was compliant with home exercise program.  Response to previous treatment: improved symptoms  Functional change: taking less medication.    Pain: 0/10  Location:  left upper trapezial, sometimes left thumb goes numb.     OBJECTIVE     Objective Measures updated at progress report unless specified.     Strength:     U/E MMT Right Left Pain/Dysfunction with Movement 6/24/24   Cervical Side Bending 4/5 4/5 + trunk shift 4+/5   Upper trapezial  4/5 4/5   4+/5   Shoulder Abduction 4/5 4/5   4+/5   Shoulder IR 4/5 4-/5   4+/5   Shoulder ER    4/5 4-/5   4+/5   Serratus Anterior 4/5 3+/5   4+/5   Supraspinatus  4/5 4-/5   4+/5   Elbow Flexion   4/5 4-/5   4+/5   Elbow Extension 4/5 4-/5   4+/5   Wrist Extension 4/5 4/5   4+/5   4th/5th Finger Intrinsics 4/5 4/5   4+/5         Muscle Length:         Muscle Tested  Right Left  6/24/24   Upper Trapezius [] Normal      [x] Limited [] Normal      [x] Limited [] Normal      [x] Limited    Levator Scapular  [] Normal      [x] Limited [] Normal      [x] Limited [] Normal      [x] Limited    Pectoralis Minor [] Normal      [x] Limited [] Normal      [x] Limited [] Normal      [x] Limited    Pectoralis Major [] Normal      [x] Limited [] Normal      [x] Limited  [x] Normal      [] Limited        Joint Mobility:   JOINT MOBILITY CHARTS:   Joint Motion Right Mobility  (spine) Left Mobility Goal 6/24/24   1st Rib  [x] Hypo     [] Normal     [] Hyper [x] Hypo     [] Normal     [] Hyper Normal  [x] Hypo       FOTO:      TREATMENT        CPT Intervention Duration / Intensity  6/10/2024    MT  bilateral upper trapezial, Levator scapula, pectorals, subscapularis and latissimus dorsi, z, first rib mobility   15   TE  UBE  3'/3'    TE  shoulder rolls bw  10x/ 2 sets   TE Doorway stretch -    TE  bilateral shoulder ER red   10x/2 sets   TE FOTO/re-assess x   NMR  series 6 airex   Pectoral stretch  Swimmers  Hugs  Shoulder rolls  Retraction/protraction  angels  -  3 min  12x  12x  12x  12x  12x   NMR  prone I, T, Y bilaterally (add weight next visit)  10x/ 2 sets each   NMR Quadruped reach back (add resistance next visit) 5x/ 2 set   NMR First rib mobility with breathing  5x   TA stand rows #5  10x/ 2 sets    TA  Stand lat pulls #5   10x/ 2 sets    TA  Paloff press 10# at weighted pulleys  10x/ 2 sets   PLAN  progress as able        CPT Codes available for Billing:   (15) minutes of Manual therapy (MT) to improve pain and ROM.  (20) minutes of Therapeutic Exercise (TE) to develop strength, endurance, range of motion, and flexibility.  (20) minutes of Neuromuscular Re-Education (NMR)  to improve: Balance, Coordination,  Kinesthetic, Sense, Proprioception, and Posture.  (10) minutes of Therapeutic Activities (TA) to improve functional performance.  Unattended Electrical Stimulation (ES) for muscle performance or pain modulation.  BFR: Blood flow restriction applied during exercise  NP or (-): Not Performed              PATIENT EDUCATION AND HOME EXERCISES     Home Exercises Provided and Patient Education Provided     Education provided:   - posture/strength/positioning with work activities    Written Home Exercises Provided: Patient instructed to cont prior HEP. Exercises were reviewed and Roger was able to demonstrate them prior to the end of the session.  Roger demonstrated good  understanding of the education provided. See EMR under Patient Instructions for exercises provided during therapy sessions      ASSESSMENT     Patient has made good progress with objective measurements of strength and per FOTO improved function - she is also reporting decreased pain. She remains with decreased MLT and joint mobility along with increased muscle tone over levator scapula and upper trapezial.   She tolerated all treatment well today and was encouraged to participate with home exercise program - discussed alternate positioning for work station as well.    Roger Is progressing well towards her goals.   Pt prognosis is Good.     Pt will continue to benefit from skilled outpatient physical therapy to address the deficits listed in the problem list box on initial evaluation, provide pt/family education and to maximize pt's level of independence in the home and community environment.     Pt's spiritual, cultural and educational needs considered and pt agreeable to plan of care and goals.     Anticipated barriers to physical therapy:  co-morbidities, sedentary lifestyle, chronicity of condition, lack of support system, and coping style     Goals:   Reviewed: 6/24/2024       Short Term Goals: In 4 weeks  Progress Date   1.Patient to be  educated on HEP. [x] Progressing  [] MET   [] Not MET   [] Not tested  6/24/2024   2.Patient to increase bilateral UE strength by 1/2 grade, in order to improve endurance and increase ability to perform all functional activities for increased time.  [] Progressing  [x] MET   [] Not MET  [] Not tested  6/24/2024   3.Patient to have pain less than 5/10 at worst, to improve QOL. [x] Progressing  [] MET   [] Not MET  [] Not tested  6/24/2024   4.Patient to improve score on the FOTO, to improve QOL. [] Progressing  [x] MET   [] Not MET  [] Not tested  6/24/2024      Long Term Goals: In 8 weeks Progress Date   1.Patient to improve score on the FOTO predicted score or better, to improve QOL. [x] Progressing  [] MET   [] Not MET  [] Not tested  6/24/2024   2. Patient to increase bilateral UE strength to 5/5 or greater, in order to improve endurance and increase ability to perform all functional activities for increased time. [x] Progressing  [] MET   [] Not MET  [] Not tested  6/24/2024   3. Patient to have decreased pain to 2/10 at worst, to improve QOL. [x] Progressing  [] MET   [] Not MET  [] Not tested  6/24/2024   4. Patient to perform daily activities including work activities with only mild increased symptoms. [x] Progressing  [] MET   [] Not MET  [] Not tested  6/24/2024        PLAN     Monitor response to today's treatment session and progress with Physical Therapy plan of care as indicated.    Plan of care Certification: 5/20/2024  to 7/20/2024.     Outpatient Physical Therapy 1-2 times weekly for 8 weeks to include any combination of the following interventions: electrical stimulation (PRN), Manual Therapy, Neuromuscular Re-ed, Patient Education/Self Care, Therapeutic Activites, Therapeutic Exercise, Dry Needling, and Moist Hot Pack/Cold Pack    Trudy Garrison, PT

## 2024-07-09 ENCOUNTER — PATIENT MESSAGE (OUTPATIENT)
Dept: ADMINISTRATIVE | Facility: HOSPITAL | Age: 29
End: 2024-07-09
Payer: MEDICAID

## 2024-07-09 ENCOUNTER — TELEPHONE (OUTPATIENT)
Dept: REHABILITATION | Facility: HOSPITAL | Age: 29
End: 2024-07-09

## 2024-07-17 ENCOUNTER — CLINICAL SUPPORT (OUTPATIENT)
Dept: REHABILITATION | Facility: HOSPITAL | Age: 29
End: 2024-07-17
Payer: MEDICAID

## 2024-07-17 DIAGNOSIS — M89.9 SCAPULAR DYSFUNCTION: ICD-10-CM

## 2024-07-17 DIAGNOSIS — R68.89 DECREASED FUNCTIONAL ACTIVITY TOLERANCE: Primary | ICD-10-CM

## 2024-07-17 DIAGNOSIS — R52 PAIN: ICD-10-CM

## 2024-07-17 DIAGNOSIS — R53.1 DECREASED STRENGTH: ICD-10-CM

## 2024-07-17 PROCEDURE — 97110 THERAPEUTIC EXERCISES: CPT

## 2024-07-17 NOTE — PROGRESS NOTES
[facility]   Physical Therapy Treatment Note        Name: Roger Juarez  Clinic Number: [id number]    Therapy Diagnosis:   Encounter Diagnoses   Name Primary?    Decreased functional activity tolerance Yes    Pain     Scapular dysfunction     Decreased strength      Physician: Sade Kelly MD    Visit Date: 7/17/2024    Physician Orders: PT Eval and Treat  Medical Diagnosis from Referral: Chronic left shoulder pain  Evaluation Date: 5/20/2024  Authorization Period Expiration: 12/31/2024  Plan of Care Expiration: 7/20/2024  Progress Note Due: 7/20/2024  Visit # / Visits authorized: 5/10 (+1)  FOTO: 2/3 (last performed on 6/24/2024)         Precautions: Standard    Time In: 8:55 am   Time Out: 10:00 am   Total Billable Time: 53 minutes  Billing reflects one on one time spent with patient. Billing reflects [place] medicaid guidelines, billing all therapy as therapeutic-exercise      SUBJECTIVE     Pt reports: Only having pain when lifting or at work, missed last 3 weeks of therapy - has been doing a lot of walking but no exercises. The only time she has had pain since last seen was with work activities or lifting luggage while on vacation.    She was compliant with home exercise program.  Response to previous treatment: improved symptoms  Functional change: taking less medication.    Pain: 0/10  Location:  left upper trapezial, sometimes left thumb goes numb.     OBJECTIVE     Objective Measures updated at progress report unless specified.     TREATMENT        CPT Intervention 7/17/2024  Duration / Intensity     MT  bilateral upper trapezial, Levator scapula, pectorals, subscapularis and latissimus dorsi, first rib mobility.   FDN left upper trapezial with prolonged estim for twitch response. x  15   TE  UBE x  3'/3'    TE  shoulder rolls bw -  10x/ 2 sets   TE Doorway stretch  -    TE  bilateral shoulder ER red  x  10x/2 sets   TE FOTO/re-assess     NMR  series 6 airex    Pectoral stretch  Swimmers  Hugs  Shoulder rolls  Retraction/protraction  angels -  x  x  x  x  x  x  -  3 min  12x  12x  12x  12x  12x   NMR  prone I, T, Y bilaterally (add weight next visit) x  10x/ 2 sets each   NMR Quadruped reach back (add resistance next visit) x 5x/ 2 set   NMR First rib mobility with breathing  x 5x, bilaterally    TA stand rows #5 x  10x/ 2 sets    TA  Stand lat pulls #5  x  10x/ 2 sets    TA  Paloff press 10# at weighted pulleys x  10x/ 2 sets   PLAN  progress as able         CPT Codes available for Billing:   (12) minutes of Manual therapy (MT) to improve pain and ROM.  (10) minutes of Therapeutic Exercise (TE) to develop strength, endurance, range of motion, and flexibility.  (23) minutes of Neuromuscular Re-Education (NMR)  to improve: Balance, Coordination, Kinesthetic, Sense, Proprioception, and Posture.  (8) minutes of Therapeutic Activities (TA) to improve functional performance.  Unattended Electrical Stimulation (ES) for muscle performance or pain modulation.  BFR: Blood flow restriction applied during exercise  NP or (-): Not Performed    See EMR under MEDIA for written consent provided.         PATIENT EDUCATION AND HOME EXERCISES     Home Exercises Provided and Patient Education Provided     Education provided:   - posture/strength/positioning with work activities    Written Home Exercises Provided: Patient instructed to cont prior HEP. Exercises were reviewed and Roger was able to demonstrate them prior to the end of the session.  Roger demonstrated good  understanding of the education provided. See EMR under Patient Instructions for exercises provided during therapy sessions      ASSESSMENT   Patient demonstrated appropriate response to Functional Dry Needling. Good rhythmical contractions observed with estim to treated muscle groups.   Patient tolerated all treatment well and reported decreased pain post treatment session. Encouraged regular home exercise program and  consistency with strengthening.     Roger Is progressing well towards her goals.   Pt prognosis is Good.     Pt will continue to benefit from skilled outpatient physical therapy to address the deficits listed in the problem list box on initial evaluation, provide pt/family education and to maximize pt's level of independence in the home and community environment.     Pt's spiritual, cultural and educational needs considered and pt agreeable to plan of care and goals.     Anticipated barriers to physical therapy:  co-morbidities, sedentary lifestyle, chronicity of condition, lack of support system, and coping style     Goals:   Reviewed: 7/17/2024       Short Term Goals: In 4 weeks  Progress Date   1.Patient to be educated on HEP. [x] Progressing  [] MET   [] Not MET   [] Not tested  6/24/2024   2.Patient to increase bilateral UE strength by 1/2 grade, in order to improve endurance and increase ability to perform all functional activities for increased time.  [] Progressing  [x] MET   [] Not MET  [] Not tested  6/24/2024   3.Patient to have pain less than 5/10 at worst, to improve QOL. [x] Progressing  [] MET   [] Not MET  [] Not tested  6/24/2024   4.Patient to improve score on the FOTO, to improve QOL. [] Progressing  [x] MET   [] Not MET  [] Not tested  6/24/2024      Long Term Goals: In 8 weeks Progress Date   1.Patient to improve score on the FOTO predicted score or better, to improve QOL. [x] Progressing  [] MET   [] Not MET  [] Not tested  6/24/2024   2. Patient to increase bilateral UE strength to 5/5 or greater, in order to improve endurance and increase ability to perform all functional activities for increased time. [x] Progressing  [] MET   [] Not MET  [] Not tested  6/24/2024   3. Patient to have decreased pain to 2/10 at worst, to improve QOL. [x] Progressing  [] MET   [] Not MET  [] Not tested  6/24/2024   4. Patient to perform daily activities including work activities with only mild increased  symptoms. [x] Progressing  [] MET   [] Not MET  [] Not tested  6/24/2024        PLAN     Monitor response to today's treatment session and progress with Physical Therapy plan of care as indicated.    Plan of care Certification: 5/20/2024  to 7/20/2024.     Outpatient Physical Therapy 1-2 times weekly for 8 weeks to include any combination of the following interventions: electrical stimulation (PRN), Manual Therapy, Neuromuscular Re-ed, Patient Education/Self Care, Therapeutic Activites, Therapeutic Exercise, Dry Needling, and Moist Hot Pack/Cold Pack    Trudy Garrison, PT

## 2024-07-22 ENCOUNTER — TELEPHONE (OUTPATIENT)
Dept: REHABILITATION | Facility: HOSPITAL | Age: 29
End: 2024-07-22
Payer: MEDICAID

## 2024-11-05 ENCOUNTER — PATIENT MESSAGE (OUTPATIENT)
Dept: RESEARCH | Facility: HOSPITAL | Age: 29
End: 2024-11-05
Payer: MEDICAID

## 2025-08-25 ENCOUNTER — PATIENT OUTREACH (OUTPATIENT)
Dept: ADMINISTRATIVE | Facility: OTHER | Age: 30
End: 2025-08-25
Payer: MEDICAID

## 2025-08-25 ENCOUNTER — LAB VISIT (OUTPATIENT)
Dept: LAB | Facility: HOSPITAL | Age: 30
End: 2025-08-25
Attending: NURSE PRACTITIONER
Payer: MEDICAID

## 2025-08-25 ENCOUNTER — OFFICE VISIT (OUTPATIENT)
Dept: PRIMARY CARE CLINIC | Facility: CLINIC | Age: 30
End: 2025-08-25
Payer: MEDICAID

## 2025-08-25 VITALS
HEIGHT: 65 IN | SYSTOLIC BLOOD PRESSURE: 104 MMHG | WEIGHT: 258.81 LBS | HEART RATE: 67 BPM | DIASTOLIC BLOOD PRESSURE: 72 MMHG | OXYGEN SATURATION: 99 % | TEMPERATURE: 99 F | BODY MASS INDEX: 43.12 KG/M2

## 2025-08-25 DIAGNOSIS — Z00.00 GENERAL MEDICAL EXAM: ICD-10-CM

## 2025-08-25 DIAGNOSIS — Z13.6 ENCOUNTER FOR LIPID SCREENING FOR CARDIOVASCULAR DISEASE: ICD-10-CM

## 2025-08-25 DIAGNOSIS — Z13.220 ENCOUNTER FOR LIPID SCREENING FOR CARDIOVASCULAR DISEASE: ICD-10-CM

## 2025-08-25 DIAGNOSIS — Z13.1 SCREENING FOR DIABETES MELLITUS: ICD-10-CM

## 2025-08-25 DIAGNOSIS — M25.512 CHRONIC LEFT SHOULDER PAIN: ICD-10-CM

## 2025-08-25 DIAGNOSIS — M25.512 LEFT SHOULDER PAIN, UNSPECIFIED CHRONICITY: ICD-10-CM

## 2025-08-25 DIAGNOSIS — K21.00 GASTROESOPHAGEAL REFLUX DISEASE WITH ESOPHAGITIS WITHOUT HEMORRHAGE: Primary | ICD-10-CM

## 2025-08-25 DIAGNOSIS — K21.00 GASTROESOPHAGEAL REFLUX DISEASE WITH ESOPHAGITIS WITHOUT HEMORRHAGE: ICD-10-CM

## 2025-08-25 DIAGNOSIS — G89.29 CHRONIC LEFT SHOULDER PAIN: ICD-10-CM

## 2025-08-25 LAB
ABSOLUTE EOSINOPHIL (OHS): 0.06 K/UL
ABSOLUTE MONOCYTE (OHS): 0.33 K/UL (ref 0.3–1)
ABSOLUTE NEUTROPHIL COUNT (OHS): 2.64 K/UL (ref 1.8–7.7)
ALBUMIN SERPL BCP-MCNC: 3.9 G/DL (ref 3.5–5.2)
ALP SERPL-CCNC: 46 UNIT/L (ref 40–150)
ALT SERPL W/O P-5'-P-CCNC: 17 UNIT/L (ref 0–55)
ANION GAP (OHS): 12 MMOL/L (ref 8–16)
AST SERPL-CCNC: 19 UNIT/L (ref 0–50)
BASOPHILS # BLD AUTO: 0.03 K/UL
BASOPHILS NFR BLD AUTO: 0.7 %
BILIRUB SERPL-MCNC: 0.3 MG/DL (ref 0.1–1)
BUN SERPL-MCNC: 8 MG/DL (ref 6–20)
CALCIUM SERPL-MCNC: 8.8 MG/DL (ref 8.7–10.5)
CHLORIDE SERPL-SCNC: 107 MMOL/L (ref 95–110)
CHOLEST SERPL-MCNC: 182 MG/DL (ref 120–199)
CHOLEST/HDLC SERPL: 3.3 {RATIO} (ref 2–5)
CO2 SERPL-SCNC: 19 MMOL/L (ref 23–29)
CREAT SERPL-MCNC: 0.7 MG/DL (ref 0.5–1.4)
EAG (OHS): 108 MG/DL (ref 68–131)
ERYTHROCYTE [DISTWIDTH] IN BLOOD BY AUTOMATED COUNT: 13.3 % (ref 11.5–14.5)
GFR SERPLBLD CREATININE-BSD FMLA CKD-EPI: >60 ML/MIN/1.73/M2
GLUCOSE SERPL-MCNC: 77 MG/DL (ref 70–110)
HBA1C MFR BLD: 5.4 % (ref 4–5.6)
HCT VFR BLD AUTO: 40 % (ref 37–48.5)
HDLC SERPL-MCNC: 56 MG/DL (ref 40–75)
HDLC SERPL: 30.8 % (ref 20–50)
HGB BLD-MCNC: 12.3 GM/DL (ref 12–16)
IMM GRANULOCYTES # BLD AUTO: 0.01 K/UL (ref 0–0.04)
IMM GRANULOCYTES NFR BLD AUTO: 0.2 % (ref 0–0.5)
LDLC SERPL CALC-MCNC: 118 MG/DL (ref 63–159)
LYMPHOCYTES # BLD AUTO: 1.42 K/UL (ref 1–4.8)
MCH RBC QN AUTO: 27 PG (ref 27–31)
MCHC RBC AUTO-ENTMCNC: 30.8 G/DL (ref 32–36)
MCV RBC AUTO: 88 FL (ref 82–98)
NONHDLC SERPL-MCNC: 126 MG/DL
NUCLEATED RBC (/100WBC) (OHS): 0 /100 WBC
PLATELET # BLD AUTO: 270 K/UL (ref 150–450)
PMV BLD AUTO: 11.6 FL (ref 9.2–12.9)
POTASSIUM SERPL-SCNC: 4 MMOL/L (ref 3.5–5.1)
PROT SERPL-MCNC: 7.6 GM/DL (ref 6–8.4)
RBC # BLD AUTO: 4.56 M/UL (ref 4–5.4)
RELATIVE EOSINOPHIL (OHS): 1.3 %
RELATIVE LYMPHOCYTE (OHS): 31.6 % (ref 18–48)
RELATIVE MONOCYTE (OHS): 7.3 % (ref 4–15)
RELATIVE NEUTROPHIL (OHS): 58.9 % (ref 38–73)
SODIUM SERPL-SCNC: 138 MMOL/L (ref 136–145)
TRIGL SERPL-MCNC: 40 MG/DL (ref 30–150)
TSH SERPL-ACNC: 1.13 UIU/ML (ref 0.4–4)
WBC # BLD AUTO: 4.49 K/UL (ref 3.9–12.7)

## 2025-08-25 PROCEDURE — 99213 OFFICE O/P EST LOW 20 MIN: CPT | Mod: PBBFAC,PN | Performed by: NURSE PRACTITIONER

## 2025-08-25 PROCEDURE — 1159F MED LIST DOCD IN RCRD: CPT | Mod: CPTII,,, | Performed by: NURSE PRACTITIONER

## 2025-08-25 PROCEDURE — 99214 OFFICE O/P EST MOD 30 MIN: CPT | Mod: S$PBB,,, | Performed by: NURSE PRACTITIONER

## 2025-08-25 PROCEDURE — 3044F HG A1C LEVEL LT 7.0%: CPT | Mod: CPTII,,, | Performed by: NURSE PRACTITIONER

## 2025-08-25 PROCEDURE — 1160F RVW MEDS BY RX/DR IN RCRD: CPT | Mod: CPTII,,, | Performed by: NURSE PRACTITIONER

## 2025-08-25 PROCEDURE — 86677 HELICOBACTER PYLORI ANTIBODY: CPT

## 2025-08-25 PROCEDURE — 85025 COMPLETE CBC W/AUTO DIFF WBC: CPT

## 2025-08-25 PROCEDURE — 3078F DIAST BP <80 MM HG: CPT | Mod: CPTII,,, | Performed by: NURSE PRACTITIONER

## 2025-08-25 PROCEDURE — 83036 HEMOGLOBIN GLYCOSYLATED A1C: CPT

## 2025-08-25 PROCEDURE — 83718 ASSAY OF LIPOPROTEIN: CPT

## 2025-08-25 PROCEDURE — 99999 PR PBB SHADOW E&M-EST. PATIENT-LVL III: CPT | Mod: PBBFAC,,, | Performed by: NURSE PRACTITIONER

## 2025-08-25 PROCEDURE — 84443 ASSAY THYROID STIM HORMONE: CPT

## 2025-08-25 PROCEDURE — 36415 COLL VENOUS BLD VENIPUNCTURE: CPT | Mod: PN

## 2025-08-25 PROCEDURE — 3008F BODY MASS INDEX DOCD: CPT | Mod: CPTII,,, | Performed by: NURSE PRACTITIONER

## 2025-08-25 PROCEDURE — 82040 ASSAY OF SERUM ALBUMIN: CPT

## 2025-08-25 PROCEDURE — 3074F SYST BP LT 130 MM HG: CPT | Mod: CPTII,,, | Performed by: NURSE PRACTITIONER

## 2025-08-25 RX ORDER — OMEPRAZOLE 40 MG/1
40 CAPSULE, DELAYED RELEASE ORAL DAILY
Qty: 90 CAPSULE | Refills: 3 | Status: SHIPPED | OUTPATIENT
Start: 2025-08-25 | End: 2026-08-25

## 2025-08-25 RX ORDER — TIZANIDINE 4 MG/1
4 TABLET ORAL NIGHTLY PRN
Qty: 30 TABLET | Refills: 2 | Status: SHIPPED | OUTPATIENT
Start: 2025-08-25 | End: 2025-11-23

## 2025-08-25 RX ORDER — DICLOFENAC SODIUM 75 MG/1
75 TABLET, DELAYED RELEASE ORAL 2 TIMES DAILY PRN
Qty: 20 TABLET | Refills: 3 | Status: SHIPPED | OUTPATIENT
Start: 2025-08-25 | End: 2025-09-04

## 2025-08-26 LAB — H PYLORI IGG SERPL QL IA: NEGATIVE

## (undated) DEVICE — PAD ABD COMB CELOS 8X10 STRL

## (undated) DEVICE — Device

## (undated) DEVICE — TAPE SILK 3IN

## (undated) DEVICE — DRESSING AQUACEL AG 3.5X10IN